# Patient Record
Sex: FEMALE | Race: OTHER | NOT HISPANIC OR LATINO
[De-identification: names, ages, dates, MRNs, and addresses within clinical notes are randomized per-mention and may not be internally consistent; named-entity substitution may affect disease eponyms.]

---

## 2021-03-26 ENCOUNTER — RESULT REVIEW (OUTPATIENT)
Age: 36
End: 2021-03-26

## 2021-07-06 PROBLEM — Z00.00 ENCOUNTER FOR PREVENTIVE HEALTH EXAMINATION: Status: ACTIVE | Noted: 2021-07-06

## 2021-07-08 ENCOUNTER — ASOB RESULT (OUTPATIENT)
Age: 36
End: 2021-07-08

## 2021-07-08 ENCOUNTER — APPOINTMENT (OUTPATIENT)
Dept: ANTEPARTUM | Facility: CLINIC | Age: 36
End: 2021-07-08
Payer: COMMERCIAL

## 2021-07-08 PROCEDURE — 76801 OB US < 14 WKS SINGLE FETUS: CPT

## 2021-07-08 PROCEDURE — 99202 OFFICE O/P NEW SF 15 MIN: CPT | Mod: 25

## 2021-07-08 PROCEDURE — 99072 ADDL SUPL MATRL&STAF TM PHE: CPT

## 2021-07-28 ENCOUNTER — APPOINTMENT (OUTPATIENT)
Dept: ANTEPARTUM | Facility: CLINIC | Age: 36
End: 2021-07-28
Payer: COMMERCIAL

## 2021-07-28 ENCOUNTER — ASOB RESULT (OUTPATIENT)
Age: 36
End: 2021-07-28

## 2021-07-28 PROCEDURE — 76805 OB US >/= 14 WKS SNGL FETUS: CPT

## 2021-07-30 ENCOUNTER — APPOINTMENT (OUTPATIENT)
Dept: ANTEPARTUM | Facility: CLINIC | Age: 36
End: 2021-07-30

## 2021-08-06 ENCOUNTER — OUTPATIENT (OUTPATIENT)
Dept: OUTPATIENT SERVICES | Facility: HOSPITAL | Age: 36
LOS: 1 days | End: 2021-08-06
Payer: COMMERCIAL

## 2021-08-06 ENCOUNTER — APPOINTMENT (OUTPATIENT)
Dept: ANTEPARTUM | Facility: CLINIC | Age: 36
End: 2021-08-06
Payer: COMMERCIAL

## 2021-08-06 ENCOUNTER — ASOB RESULT (OUTPATIENT)
Age: 36
End: 2021-08-06

## 2021-08-06 DIAGNOSIS — O20.8 OTHER HEMORRHAGE IN EARLY PREGNANCY: ICD-10-CM

## 2021-08-06 DIAGNOSIS — O28.5 ABNORMAL CHROMOSOMAL AND GENETIC FINDING ON ANTENATAL SCREENING OF MOTHER: ICD-10-CM

## 2021-08-06 DIAGNOSIS — Z3A.17 17 WEEKS GESTATION OF PREGNANCY: ICD-10-CM

## 2021-08-06 PROCEDURE — 76946 ECHO GUIDE FOR AMNIOCENTESIS: CPT

## 2021-08-06 PROCEDURE — 76946 ECHO GUIDE FOR AMNIOCENTESIS: CPT | Mod: 26

## 2021-08-06 PROCEDURE — 59000 AMNIOCENTESIS DIAGNOSTIC: CPT

## 2021-08-27 ENCOUNTER — APPOINTMENT (OUTPATIENT)
Dept: ANTEPARTUM | Facility: CLINIC | Age: 36
End: 2021-08-27
Payer: COMMERCIAL

## 2021-08-27 ENCOUNTER — ASOB RESULT (OUTPATIENT)
Age: 36
End: 2021-08-27

## 2021-08-27 PROCEDURE — 76811 OB US DETAILED SNGL FETUS: CPT

## 2021-10-12 DIAGNOSIS — Z01.818 ENCOUNTER FOR OTHER PREPROCEDURAL EXAMINATION: ICD-10-CM

## 2022-01-09 ENCOUNTER — TRANSCRIPTION ENCOUNTER (OUTPATIENT)
Age: 37
End: 2022-01-09

## 2022-01-09 ENCOUNTER — INPATIENT (INPATIENT)
Facility: HOSPITAL | Age: 37
LOS: 1 days | Discharge: HOME | End: 2022-01-11
Attending: OBSTETRICS & GYNECOLOGY | Admitting: OBSTETRICS & GYNECOLOGY
Payer: COMMERCIAL

## 2022-01-09 VITALS — OXYGEN SATURATION: 99 % | HEART RATE: 83 BPM

## 2022-01-09 DIAGNOSIS — Z3A.00 WEEKS OF GESTATION OF PREGNANCY NOT SPECIFIED: ICD-10-CM

## 2022-01-09 DIAGNOSIS — Z34.80 ENCOUNTER FOR SUPERVISION OF OTHER NORMAL PREGNANCY, UNSPECIFIED TRIMESTER: ICD-10-CM

## 2022-01-09 DIAGNOSIS — O26.899 OTHER SPECIFIED PREGNANCY RELATED CONDITIONS, UNSPECIFIED TRIMESTER: ICD-10-CM

## 2022-01-09 DIAGNOSIS — Z98.890 OTHER SPECIFIED POSTPROCEDURAL STATES: Chronic | ICD-10-CM

## 2022-01-09 LAB
BASOPHILS # BLD AUTO: 0.03 K/UL — SIGNIFICANT CHANGE UP (ref 0–0.2)
BASOPHILS # BLD AUTO: 0.03 K/UL — SIGNIFICANT CHANGE UP (ref 0–0.2)
BASOPHILS NFR BLD AUTO: 0.2 % — SIGNIFICANT CHANGE UP (ref 0–2)
BASOPHILS NFR BLD AUTO: 0.3 % — SIGNIFICANT CHANGE UP (ref 0–2)
BLD GP AB SCN SERPL QL: NEGATIVE — SIGNIFICANT CHANGE UP
COVID-19 SPIKE DOMAIN AB INTERP: POSITIVE
COVID-19 SPIKE DOMAIN ANTIBODY RESULT: 144 U/ML — HIGH
EOSINOPHIL # BLD AUTO: 0.02 K/UL — SIGNIFICANT CHANGE UP (ref 0–0.5)
EOSINOPHIL # BLD AUTO: 0.04 K/UL — SIGNIFICANT CHANGE UP (ref 0–0.5)
EOSINOPHIL NFR BLD AUTO: 0.2 % — SIGNIFICANT CHANGE UP (ref 0–6)
EOSINOPHIL NFR BLD AUTO: 0.2 % — SIGNIFICANT CHANGE UP (ref 0–6)
HCT VFR BLD CALC: 28.6 % — LOW (ref 34.5–45)
HCT VFR BLD CALC: 34.1 % — LOW (ref 34.5–45)
HGB BLD-MCNC: 10 G/DL — LOW (ref 11.5–15.5)
HGB BLD-MCNC: 12.1 G/DL — SIGNIFICANT CHANGE UP (ref 11.5–15.5)
IMM GRANULOCYTES NFR BLD AUTO: 0.5 % — SIGNIFICANT CHANGE UP (ref 0–1.5)
IMM GRANULOCYTES NFR BLD AUTO: 0.5 % — SIGNIFICANT CHANGE UP (ref 0–1.5)
LYMPHOCYTES # BLD AUTO: 0.8 K/UL — LOW (ref 1–3.3)
LYMPHOCYTES # BLD AUTO: 1.01 K/UL — SIGNIFICANT CHANGE UP (ref 1–3.3)
LYMPHOCYTES # BLD AUTO: 4.7 % — LOW (ref 13–44)
LYMPHOCYTES # BLD AUTO: 9.4 % — LOW (ref 13–44)
MCHC RBC-ENTMCNC: 33.7 PG — SIGNIFICANT CHANGE UP (ref 27–34)
MCHC RBC-ENTMCNC: 34.1 PG — HIGH (ref 27–34)
MCHC RBC-ENTMCNC: 35 GM/DL — SIGNIFICANT CHANGE UP (ref 32–36)
MCHC RBC-ENTMCNC: 35.5 GM/DL — SIGNIFICANT CHANGE UP (ref 32–36)
MCV RBC AUTO: 96.1 FL — SIGNIFICANT CHANGE UP (ref 80–100)
MCV RBC AUTO: 96.3 FL — SIGNIFICANT CHANGE UP (ref 80–100)
MONOCYTES # BLD AUTO: 0.47 K/UL — SIGNIFICANT CHANGE UP (ref 0–0.9)
MONOCYTES # BLD AUTO: 1.23 K/UL — HIGH (ref 0–0.9)
MONOCYTES NFR BLD AUTO: 4.4 % — SIGNIFICANT CHANGE UP (ref 2–14)
MONOCYTES NFR BLD AUTO: 7.2 % — SIGNIFICANT CHANGE UP (ref 2–14)
NEUTROPHILS # BLD AUTO: 14.89 K/UL — HIGH (ref 1.8–7.4)
NEUTROPHILS # BLD AUTO: 9.18 K/UL — HIGH (ref 1.8–7.4)
NEUTROPHILS NFR BLD AUTO: 85.2 % — HIGH (ref 43–77)
NEUTROPHILS NFR BLD AUTO: 87.2 % — HIGH (ref 43–77)
NRBC # BLD: 0 /100 WBCS — SIGNIFICANT CHANGE UP (ref 0–0)
NRBC # BLD: 0 /100 WBCS — SIGNIFICANT CHANGE UP (ref 0–0)
PLATELET # BLD AUTO: 105 K/UL — LOW (ref 150–400)
PLATELET # BLD AUTO: 108 K/UL — LOW (ref 150–400)
RBC # BLD: 2.97 M/UL — LOW (ref 3.8–5.2)
RBC # BLD: 3.55 M/UL — LOW (ref 3.8–5.2)
RBC # FLD: 15 % — HIGH (ref 10.3–14.5)
RBC # FLD: 15.1 % — HIGH (ref 10.3–14.5)
RH IG SCN BLD-IMP: POSITIVE — SIGNIFICANT CHANGE UP
RH IG SCN BLD-IMP: POSITIVE — SIGNIFICANT CHANGE UP
SARS-COV-2 IGG+IGM SERPL QL IA: 144 U/ML — HIGH
SARS-COV-2 IGG+IGM SERPL QL IA: POSITIVE
SARS-COV-2 RNA SPEC QL NAA+PROBE: SIGNIFICANT CHANGE UP
WBC # BLD: 10.76 K/UL — HIGH (ref 3.8–10.5)
WBC # BLD: 17.07 K/UL — HIGH (ref 3.8–10.5)
WBC # FLD AUTO: 10.76 K/UL — HIGH (ref 3.8–10.5)
WBC # FLD AUTO: 17.07 K/UL — HIGH (ref 3.8–10.5)

## 2022-01-09 RX ORDER — OXYTOCIN 10 UNIT/ML
333.33 VIAL (ML) INJECTION
Qty: 20 | Refills: 0 | Status: COMPLETED | OUTPATIENT
Start: 2022-01-09 | End: 2022-01-09

## 2022-01-09 RX ORDER — IBUPROFEN 200 MG
600 TABLET ORAL EVERY 6 HOURS
Refills: 0 | Status: DISCONTINUED | OUTPATIENT
Start: 2022-01-09 | End: 2022-01-11

## 2022-01-09 RX ORDER — DIPHENHYDRAMINE HCL 50 MG
25 CAPSULE ORAL EVERY 6 HOURS
Refills: 0 | Status: DISCONTINUED | OUTPATIENT
Start: 2022-01-09 | End: 2022-01-11

## 2022-01-09 RX ORDER — OXYCODONE HYDROCHLORIDE 5 MG/1
5 TABLET ORAL ONCE
Refills: 0 | Status: DISCONTINUED | OUTPATIENT
Start: 2022-01-09 | End: 2022-01-11

## 2022-01-09 RX ORDER — TRANEXAMIC ACID 100 MG/ML
1000 INJECTION, SOLUTION INTRAVENOUS ONCE
Refills: 0 | Status: DISCONTINUED | OUTPATIENT
Start: 2022-01-09 | End: 2022-01-09

## 2022-01-09 RX ORDER — CITRIC ACID/SODIUM CITRATE 300-500 MG
15 SOLUTION, ORAL ORAL EVERY 6 HOURS
Refills: 0 | Status: DISCONTINUED | OUTPATIENT
Start: 2022-01-09 | End: 2022-01-09

## 2022-01-09 RX ORDER — SODIUM CHLORIDE 9 MG/ML
1000 INJECTION, SOLUTION INTRAVENOUS
Refills: 0 | Status: DISCONTINUED | OUTPATIENT
Start: 2022-01-09 | End: 2022-01-09

## 2022-01-09 RX ORDER — KETOROLAC TROMETHAMINE 30 MG/ML
30 SYRINGE (ML) INJECTION ONCE
Refills: 0 | Status: DISCONTINUED | OUTPATIENT
Start: 2022-01-09 | End: 2022-01-09

## 2022-01-09 RX ORDER — MAGNESIUM HYDROXIDE 400 MG/1
30 TABLET, CHEWABLE ORAL
Refills: 0 | Status: DISCONTINUED | OUTPATIENT
Start: 2022-01-09 | End: 2022-01-11

## 2022-01-09 RX ORDER — AER TRAVELER 0.5 G/1
1 SOLUTION RECTAL; TOPICAL EVERY 4 HOURS
Refills: 0 | Status: DISCONTINUED | OUTPATIENT
Start: 2022-01-09 | End: 2022-01-11

## 2022-01-09 RX ORDER — IBUPROFEN 200 MG
600 TABLET ORAL EVERY 6 HOURS
Refills: 0 | Status: COMPLETED | OUTPATIENT
Start: 2022-01-09 | End: 2022-12-08

## 2022-01-09 RX ORDER — OXYTOCIN 10 UNIT/ML
333.33 VIAL (ML) INJECTION
Qty: 20 | Refills: 0 | Status: DISCONTINUED | OUTPATIENT
Start: 2022-01-09 | End: 2022-01-11

## 2022-01-09 RX ORDER — OXYTOCIN 10 UNIT/ML
4 VIAL (ML) INJECTION
Qty: 30 | Refills: 0 | Status: DISCONTINUED | OUTPATIENT
Start: 2022-01-09 | End: 2022-01-09

## 2022-01-09 RX ORDER — BENZOCAINE 10 %
1 GEL (GRAM) MUCOUS MEMBRANE EVERY 6 HOURS
Refills: 0 | Status: DISCONTINUED | OUTPATIENT
Start: 2022-01-09 | End: 2022-01-11

## 2022-01-09 RX ORDER — PRAMOXINE HYDROCHLORIDE 150 MG/15G
1 AEROSOL, FOAM RECTAL EVERY 4 HOURS
Refills: 0 | Status: DISCONTINUED | OUTPATIENT
Start: 2022-01-09 | End: 2022-01-11

## 2022-01-09 RX ORDER — LANOLIN
1 OINTMENT (GRAM) TOPICAL EVERY 6 HOURS
Refills: 0 | Status: DISCONTINUED | OUTPATIENT
Start: 2022-01-09 | End: 2022-01-11

## 2022-01-09 RX ORDER — TETANUS TOXOID, REDUCED DIPHTHERIA TOXOID AND ACELLULAR PERTUSSIS VACCINE, ADSORBED 5; 2.5; 8; 8; 2.5 [IU]/.5ML; [IU]/.5ML; UG/.5ML; UG/.5ML; UG/.5ML
0.5 SUSPENSION INTRAMUSCULAR ONCE
Refills: 0 | Status: DISCONTINUED | OUTPATIENT
Start: 2022-01-09 | End: 2022-01-11

## 2022-01-09 RX ORDER — OXYCODONE HYDROCHLORIDE 5 MG/1
5 TABLET ORAL
Refills: 0 | Status: DISCONTINUED | OUTPATIENT
Start: 2022-01-09 | End: 2022-01-11

## 2022-01-09 RX ORDER — DIBUCAINE 1 %
1 OINTMENT (GRAM) RECTAL EVERY 6 HOURS
Refills: 0 | Status: DISCONTINUED | OUTPATIENT
Start: 2022-01-09 | End: 2022-01-11

## 2022-01-09 RX ORDER — HYDROCORTISONE 1 %
1 OINTMENT (GRAM) TOPICAL EVERY 6 HOURS
Refills: 0 | Status: DISCONTINUED | OUTPATIENT
Start: 2022-01-09 | End: 2022-01-11

## 2022-01-09 RX ORDER — SODIUM CHLORIDE 9 MG/ML
3 INJECTION INTRAMUSCULAR; INTRAVENOUS; SUBCUTANEOUS EVERY 8 HOURS
Refills: 0 | Status: DISCONTINUED | OUTPATIENT
Start: 2022-01-09 | End: 2022-01-11

## 2022-01-09 RX ORDER — ACETAMINOPHEN 500 MG
975 TABLET ORAL
Refills: 0 | Status: DISCONTINUED | OUTPATIENT
Start: 2022-01-09 | End: 2022-01-11

## 2022-01-09 RX ORDER — TRANEXAMIC ACID 100 MG/ML
1000 INJECTION, SOLUTION INTRAVENOUS ONCE
Refills: 0 | Status: COMPLETED | OUTPATIENT
Start: 2022-01-09 | End: 2022-01-09

## 2022-01-09 RX ORDER — SIMETHICONE 80 MG/1
80 TABLET, CHEWABLE ORAL EVERY 4 HOURS
Refills: 0 | Status: DISCONTINUED | OUTPATIENT
Start: 2022-01-09 | End: 2022-01-11

## 2022-01-09 RX ADMIN — Medication 4 MILLIUNIT(S)/MIN: at 11:49

## 2022-01-09 RX ADMIN — Medication 0.2 MILLIGRAM(S): at 18:05

## 2022-01-09 RX ADMIN — Medication 30 MILLIGRAM(S): at 19:14

## 2022-01-09 RX ADMIN — SODIUM CHLORIDE 125 MILLILITER(S): 9 INJECTION, SOLUTION INTRAVENOUS at 12:33

## 2022-01-09 RX ADMIN — TRANEXAMIC ACID 220 MILLIGRAM(S): 100 INJECTION, SOLUTION INTRAVENOUS at 17:50

## 2022-01-09 RX ADMIN — Medication 1000 MILLIUNIT(S)/MIN: at 17:37

## 2022-01-09 RX ADMIN — SODIUM CHLORIDE 125 MILLILITER(S): 9 INJECTION, SOLUTION INTRAVENOUS at 10:55

## 2022-01-09 RX ADMIN — Medication 975 MILLIGRAM(S): at 21:57

## 2022-01-09 NOTE — OB PROVIDER H&P - HISTORY OF PRESENT ILLNESS
R2 H&P    Pt is a ***y/o G*P*** at *** admitted for ***  Prenatal course  GBS  EFW    OBHx:  GynHx: denies h/o abnormal paps, STI's, fibroids, cysts  PMHx:  PSHx:  Med:  All: NKDA  SH: denies alcohol, tobacco, or drug use  Psych: denies h/o anxiety or depression      EFH:  [place]:  VE:  TAUS:    A&P:   Labor: admit to L&D  -PO cytotec  -routine labs  -EFM/toco  -NPO, IV hydration  Fetal: cat 1 tracing, fetal status reassuring  GBS: neg  Analgesia:       Discussed with  Pasha Medina PGY-1 R2 H&P    Pt is a  37 y/o  at 39w2d presenting to triage with vaginal bleeding and painful contractions found to be in early labor. Patient reports that she had vaginal spotting the day prior never going through fully through a pad. She than started having painful contractions starting at 10:30pm the night prior. She began having more bright red bleeding at 5:30 AM and then began jc every 5 minutes, currently 7/10 pain.   Prenatal course c/b gestational thrombocytopenia   GBS negative  EFW 3200 (by last sono)     OBHx:   - sAB x 1 s/p D&C   - Current PNC c/b gestational thrombocytopenia, patient reports plts 4 days prior. Also had + Turners on NIPS s/p amniocentesis with normal results  - GBS neg, EFW 3200   GynHx: denies h/o abnormal paps, STI's, fibroids, cysts  PMHx: GERD  PSHx:   - Endoscopy   - D&C   Med: PNV, magnesium  All: NKDA  SH: denies alcohol, tobacco, or drug use  Psych: denies h/o anxiety or depression

## 2022-01-09 NOTE — DISCHARGE NOTE OB - PATIENT PORTAL LINK FT
You can access the FollowMyHealth Patient Portal offered by [facility] by registering at the following website: [email or web address]. By joining [employer]’s FollowMyHealth portal, you will also be able to view your health information using other applications (apps) compatible with our system.

## 2022-01-09 NOTE — OB PROVIDER H&P - NSHPPHYSICALEXAM_GEN_ALL_CORE
Vitals:  Vital Signs Last 24 Hrs  T(C): 36.4 (09 Jan 2022 10:57), Max: 36.9 (09 Jan 2022 09:23)  T(F): 97.52 (09 Jan 2022 10:57), Max: 98.42 (09 Jan 2022 09:23)  HR: 82 (09 Jan 2022 12:18) (60 - 92)  BP: 129/64 (09 Jan 2022 10:57) (119/57 - 129/64)  BP(mean): --  RR: 18 (09 Jan 2022 10:57) (18 - 18)  SpO2: 89% (09 Jan 2022 12:18) (89% - 100%)  EFH: Baseline 130s, moderate variability, accelerations present, no decels  [place]: Irregular  VE: 2.5/70/-3  Spec: minimal old blood in vaginal vault. No active bleeding from cervical os.  TAUS: vertex

## 2022-01-09 NOTE — DISCHARGE NOTE OB - HOSPITAL COURSE
Patient admitted in labor and had an vaginal delivery complicated by postpartum hemorrhage due to vaginal lacerations and atony.  Patient had an unremarkable postpartum course and was stable for discharge home on postpartum day 1.  Patient admitted in labor and had an vaginal delivery complicated by postpartum hemorrhage due to vaginal lacerations and atony.  Patient had an unremarkable postpartum course and was stable for discharge home on postpartum day 2.

## 2022-01-09 NOTE — DISCHARGE NOTE OB - CARE PLAN
Principal Discharge DX:	Normal vaginal delivery  Assessment and plan of treatment:	After discharge, please stay on pelvic rest for 6 weeks, meaning no sexual intercourse, no tampons and no douching.  No driving for 2 weeks as women can loose a lot of blood during delivery and there is a possibility of being lightheaded/fainting.  No lifting objects heavier than baby for two weeks.  Expect to have vaginal bleeding/spotting for up to six weeks.  The bleeding should get lighter and more white/light brown with time.  For bleeding soaking more than a pad an hour or passing clots greater than the size of your fist, come in to the emergency department.   1

## 2022-01-09 NOTE — DISCHARGE NOTE OB - CARE PROVIDER_API CALL
Rajwinder Breaux)  Obstetrics and Gynecology  [facility] [place], [address] [place] [address] [address]  [place] [address]  Phone: [phone]  Fax: [phone]  Follow Up Time:

## 2022-01-09 NOTE — DISCHARGE NOTE OB - NPI NUMBER (FOR SYSADMIN USE ONLY) :
Last seen 9/21/20    Last filled 12/15/20 qty 90 w/ 1 refill    Future Appointments   Date Time Provider Ronny King   7/2/2021 10:00 AM RHOAN Grayson BS AMB [[id number]]

## 2022-01-09 NOTE — OB PROVIDER H&P - ATTENDING COMMENTS
OB attending admit note     Patient seen and examined, agree with above assessment and plan.  Patient is 37 yo  at 39w3d admitted in labor.     Labs reviewed:   GBS neg   CBC 10/12/34/108  A+/ab neg   COvid pending   GBS neg      -admit  -for pitocin   -consent signed, all questions answered    Rajwinder Breaux MD

## 2022-01-09 NOTE — DISCHARGE NOTE OB - NS MD DC FALL RISK RISK
For information on Fall & Injury Prevention, visit: [email or web address] OR  [email or web address] OR  https://www.cdc.gov/steadi/patient.html

## 2022-01-09 NOTE — OB PROVIDER DELIVERY SUMMARY - NSSELHIDDEN_OBGYN_ALL_OB_FT
[NS_DeliveryAttending1_OBGYN_ALL_OB_FT:[id number]],[NS_DeliveryAssist1_OBGYN_ALL_OB_FT:[id number]],[NS_DeliveryRN_OBGYN_ALL_OB_FT:[id number]=],[NS_CirculateRN2_OBGYN_ALL_OB_FT:[id number]=]

## 2022-01-09 NOTE — DISCHARGE NOTE OB - MEDICATION SUMMARY - MEDICATIONS TO TAKE
I will START or STAY ON the medications listed below when I get home from the hospital:    acetaminophen 325 mg oral tablet  -- 3 tab(s) by mouth every 4 hours  -- Indication: For Normal vaginal delivery    ibuprofen 600 mg oral tablet  -- 1 tab(s) by mouth every 6 hours  -- Indication: For Normal vaginal delivery    ferrous sulfate 325 mg (65 mg elemental iron) oral tablet  -- 1 tab(s) by mouth 2 times a day  -- Indication: For Anemia due to acute blood loss    Prenatal Multivitamins oral tablet  -- 1 tab(s) by mouth once a day  -- Indication: For Normal vaginal delivery

## 2022-01-09 NOTE — OB PROVIDER DELIVERY SUMMARY - NSPROVIDERDELIVERYNOTE_OBGYN_ALL_OB_FT
Patient pushing over intact perineum.  Live female delivered from HORTENSIA position. Delayed cord clamping x 1 min.   Infant place on mom for skin-to-skin.  Placenta delivered spontaneously and intact with 3VC.   Uterus explored, no POCs, mild atony of MITCH--> Methegine IM given x 1.   Cervix intact.  Right second degree laceration and left sulcus tear revealed perirectal fat, repaired with 2-0 vicryl rapid.   Left labial tear repaired with 2-0 chromic. Brisk bleeding from lacerations, TXA given prior to repair.   EBL 675cc.

## 2022-01-09 NOTE — OB RN PATIENT PROFILE - NS_OBGYNHISTORY_OBGYN_ALL_OB_FT
misc x1 - 2021 with D&C  GERD  gest. thrombocytopenia  NIPS figueroa - amniocentesis negative  Covid + 9/2021

## 2022-01-09 NOTE — OB PROVIDER LABOR PROGRESS NOTE - ASSESSMENT
Patient will start pushing now    Rajwinder Breaux MD 
A/P: 35 yo  at 39w3d admitted in early labor, now in active labor  -continue pitocin, reassess 1-2hr  -peanut ball  -GBS neg   -epidural in place, for top-offs PRN    Rajwinder Breaux MD 
A/P: 35 yo  at 39w3d admitted in early labor, now receiving augmentation  -continue pitocin   -peanut ball  -GBS neg   -epidural in place, for top-offs PRMERRY Breaux MD

## 2022-01-09 NOTE — OB PROVIDER H&P - NSANTENATALSTERA_OBGYN_ALL_OB
Writer rechecked pt's blood glucose after administering correction when medication would be at peak. Pt's BG at this time was 405, increased from 367.  Writer paged peds endocrinologist per orders. Endocrinologist indicated no interventions were needed at this time.    Not applicable as gestational age is greater than or equal to 34 weeks.

## 2022-01-09 NOTE — OB PROVIDER H&P - NS_OBGYNHISTORY_OBGYN_ALL_OB_FT
OBHx:   - sAB x 1 s/p D&C   - Current PNC c/b gestational thrombocytopenia, patient reports plts 4 days prior. Also had + Turners on NIPS s/p amniocentesis with normal results  - GBS neg, EFW 3200   GynHx: denies h/o abnormal paps, STI's, fibroids, cysts

## 2022-01-09 NOTE — DISCHARGE NOTE OB - FLU SEASON?
Detail Level: Simple
Consent: The patient's consent was obtained including but not limited to risks of crusting, scabbing, blistering, scarring, darker or lighter pigmentary change, recurrence, incomplete removal and infection.
Render Post-Care Instructions In Note?: no
Number Of Freeze-Thaw Cycles: 1 freeze-thaw cycle
Post-Care Instructions: I reviewed with the patient in detail post-care instructions. Patient is to wear sunprotection, and avoid picking at any of the treated lesions. Pt may apply Vaseline to crusted or scabbing areas.
Medical Necessity Information: It is in your best interest to select a reason for this procedure from the list below. All of these items fulfill various CMS LCD requirements except the new and changing color options.
Medical Necessity Clause: This procedure was medically necessary because the lesions that were treated were:
Duration Of Freeze Thaw-Cycle (Seconds): 2
Yes...

## 2022-01-09 NOTE — OB NEONATOLOGY/PEDIATRICIAN DELIVERY SUMMARY - NSPEDSNEONOTESA_OBGYN_ALL_OB_FT
Reason for Peds consult : Clinton grunting  39W3D GA infant delivered via  to a 33 y/o  A+ mother. Pregnancy significant for gestational thrombocytopenia. OB hx : Miscariage x 1 S/P D&C. Prenatal labs : GBS neg (), HIV neg, RPR non-reactive, HBsAg neg, rubella immune. Hx of COVID  but both mother and father fully vaccinated and boosted and tested negative during this admission. AROM today at 1259 to clear fluid. Maternal Tmax 37.0. As per nurse attending the baby, baby dried, positioned and suctioned. Apgars 8/8. About half an hour of life, noted to be grunting and peds called to evaluated infant. On arrival, infant grunting with mild retractions, sats 97-98% RA, -150, mild nasal flaring, clear lungs. Rest of exam unremarkable. CPAP 5 21% initiated and given for 10 min resulting in marked improvement of grunting and retractions. Infant observed and remained comfortable on room air. Reason for Peds consult : Merna grunting  39W3D GA infant delivered via  to a 33 y/o  A+ mother. Pregnancy significant for gestational thrombocytopenia. OB hx : Miscariage x 1 S/P D&C. Prenatal labs : GBS neg (), HIV neg, RPR non-reactive, HBsAg neg, rubella immune. Hx of COVID  but both mother and father fully vaccinated and boosted and tested negative during this admission. AROM today at 1259 to clear fluid. Maternal Tmax 37.0. As per nurse attending the baby, baby dried, positioned and suctioned. Apgars 8/8. About half an hour of life, noted to be grunting and peds called to evaluated infant. On arrival, infant grunting with mild retractions, sats 97-98% RA, -150, mild nasal flaring, clear lungs. Rest of exam unremarkable. CPAP 5 21% initiated and given for 10 min resulting in marked improvement of grunting and retractions. Weaned to room air and infant remained comfortable. Mother plans to breastfeed and would like her infant to receive Hep B vaccine. EOS 0.11

## 2022-01-09 NOTE — OB RN DELIVERY SUMMARY - NSSELHIDDEN_OBGYN_ALL_OB_FT
[NS_DeliveryAttending1_OBGYN_ALL_OB_FT:[id number]],[NS_DeliveryAssist1_OBGYN_ALL_OB_FT:[id number]],[NS_DeliveryRN_OBGYN_ALL_OB_FT:[id number]],[NS_CirculateRN2_OBGYN_ALL_OB_FT:[id number]=]

## 2022-01-09 NOTE — OB RN DELIVERY SUMMARY - NS_SEPSISRSKCALC_OBGYN_ALL_OB_FT
EOS calculated successfully. EOS Risk Factor: 0.5/1000 live births ([facility] national incidence); GA=39w2d; Temp=98.6; ROM=4.55; GBS='Negative'; Antibiotics='No antibiotics or any antibiotics < 2 hrs prior to birth'

## 2022-01-09 NOTE — OB RN PATIENT PROFILE - FALL HARM RISK - UNIVERSAL INTERVENTIONS
Bed in lowest position, wheels locked, appropriate side rails in place/Call bell, personal items and telephone in reach/Instruct patient to call for assistance before getting out of bed or chair/Non-slip footwear when patient is out of bed/[place] to call system/Physically safe environment - no spills, clutter or unnecessary equipment/Purposeful Proactive Rounding/Room/bathroom lighting operational, light cord in reach

## 2022-01-09 NOTE — DISCHARGE NOTE OB - PLAN OF CARE
After discharge, please stay on pelvic rest for 6 weeks, meaning no sexual intercourse, no tampons and no douching.  No driving for 2 weeks as women can loose a lot of blood during delivery and there is a possibility of being lightheaded/fainting.  No lifting objects heavier than baby for two weeks.  Expect to have vaginal bleeding/spotting for up to six weeks.  The bleeding should get lighter and more white/light brown with time.  For bleeding soaking more than a pad an hour or passing clots greater than the size of your fist, come in to the emergency department.

## 2022-01-09 NOTE — OB PROVIDER H&P - ASSESSMENT
A&P:   Labor: admit to L&D for early labor  -PO cytotec  -routine labs, pitocin following CBC   -EFM/toco  -NPO, IV hydration  Fetal: cat 1 tracing, fetal status reassuring  GBS: neg  Analgesia:       Discussed with Dr. Saeid Medina PGY-2 A&P:   Labor: admit to L&D for early labor  - PO cytotec  - routine labs, pitocin following CBC due to hx of gestational thrombocytopenia  - EFM/toco  - CLD, IV hydration  Fetal: cat 1 tracing, fetal status reassuring  GBS: neg  Analgesia: epidural PRN      Discussed with Dr. Saeid Medina PGY-2

## 2022-01-09 NOTE — OB PROVIDER IHI INDUCTION/AUGMENTATION NOTE - NSVAGINALEXAM_OBGYN_ALL_OB_DT
09-Jan-2022 11:50 Anus position normal and patency confirmed, rectal-cutaneous fistula absent, normal anal wink.

## 2022-01-09 NOTE — OB RN PATIENT PROFILE - ALERT: PERTINENT HISTORY
Amniocentesis/1st Trimester Sonogram/20 Week Level II Sonogram/BioPhysical Profile(s)/Non Invasive Prenatal Screen (NIPS)/Fetal Non-Stress Test (NST)

## 2022-01-09 NOTE — OB PROVIDER LABOR PROGRESS NOTE - NS_SUBJECTIVE/OBJECTIVE_OBGYN_ALL_OB_FT
OB attending progress note    Patient examined s/p epidural   AROM now, clear
OB attending progress note
OB attending    Patient uncomfortable

## 2022-01-09 NOTE — OB PROVIDER H&P - NSICDXFAMILYHX_GEN_ALL_CORE_FT
FAMILY HISTORY:  Grandparent  Still living? Unknown  FH: leukemia, Age at diagnosis: Age Unknown  FH: pancreatic cancer, Age at diagnosis: Age Unknown

## 2022-01-10 DIAGNOSIS — D62 ACUTE POSTHEMORRHAGIC ANEMIA: ICD-10-CM

## 2022-01-10 LAB
BASOPHILS # BLD AUTO: 0.04 K/UL — SIGNIFICANT CHANGE UP (ref 0–0.2)
BASOPHILS NFR BLD AUTO: 0.3 % — SIGNIFICANT CHANGE UP (ref 0–2)
EOSINOPHIL # BLD AUTO: 0.04 K/UL — SIGNIFICANT CHANGE UP (ref 0–0.5)
EOSINOPHIL NFR BLD AUTO: 0.3 % — SIGNIFICANT CHANGE UP (ref 0–6)
HCT VFR BLD CALC: 27.9 % — LOW (ref 34.5–45)
HGB BLD-MCNC: 9.6 G/DL — LOW (ref 11.5–15.5)
IMM GRANULOCYTES NFR BLD AUTO: 0.4 % — SIGNIFICANT CHANGE UP (ref 0–1.5)
LYMPHOCYTES # BLD AUTO: 1.63 K/UL — SIGNIFICANT CHANGE UP (ref 1–3.3)
LYMPHOCYTES # BLD AUTO: 11.7 % — LOW (ref 13–44)
MCHC RBC-ENTMCNC: 33.7 PG — SIGNIFICANT CHANGE UP (ref 27–34)
MCHC RBC-ENTMCNC: 34.4 GM/DL — SIGNIFICANT CHANGE UP (ref 32–36)
MCV RBC AUTO: 97.9 FL — SIGNIFICANT CHANGE UP (ref 80–100)
MONOCYTES # BLD AUTO: 1.15 K/UL — HIGH (ref 0–0.9)
MONOCYTES NFR BLD AUTO: 8.3 % — SIGNIFICANT CHANGE UP (ref 2–14)
NEUTROPHILS # BLD AUTO: 10.98 K/UL — HIGH (ref 1.8–7.4)
NEUTROPHILS NFR BLD AUTO: 79 % — HIGH (ref 43–77)
NRBC # BLD: 0 /100 WBCS — SIGNIFICANT CHANGE UP (ref 0–0)
PLATELET # BLD AUTO: 119 K/UL — LOW (ref 150–400)
RBC # BLD: 2.85 M/UL — LOW (ref 3.8–5.2)
RBC # FLD: 15.2 % — HIGH (ref 10.3–14.5)
T PALLIDUM AB TITR SER: NEGATIVE — SIGNIFICANT CHANGE UP
WBC # BLD: 13.9 K/UL — HIGH (ref 3.8–10.5)
WBC # FLD AUTO: 13.9 K/UL — HIGH (ref 3.8–10.5)

## 2022-01-10 RX ORDER — FERROUS SULFATE 325(65) MG
325 TABLET ORAL
Refills: 0 | Status: DISCONTINUED | OUTPATIENT
Start: 2022-01-10 | End: 2022-01-11

## 2022-01-10 RX ORDER — ASCORBIC ACID 60 MG
500 TABLET,CHEWABLE ORAL
Refills: 0 | Status: DISCONTINUED | OUTPATIENT
Start: 2022-01-10 | End: 2022-01-11

## 2022-01-10 RX ADMIN — Medication 500 MILLIGRAM(S): at 17:38

## 2022-01-10 RX ADMIN — Medication 975 MILLIGRAM(S): at 04:12

## 2022-01-10 RX ADMIN — Medication 600 MILLIGRAM(S): at 21:30

## 2022-01-10 RX ADMIN — Medication 600 MILLIGRAM(S): at 01:08

## 2022-01-10 RX ADMIN — Medication 600 MILLIGRAM(S): at 21:00

## 2022-01-10 RX ADMIN — Medication 600 MILLIGRAM(S): at 00:03

## 2022-01-10 RX ADMIN — Medication 975 MILLIGRAM(S): at 10:30

## 2022-01-10 RX ADMIN — Medication 975 MILLIGRAM(S): at 18:30

## 2022-01-10 RX ADMIN — Medication 600 MILLIGRAM(S): at 05:11

## 2022-01-10 RX ADMIN — Medication 600 MILLIGRAM(S): at 13:30

## 2022-01-10 RX ADMIN — Medication 975 MILLIGRAM(S): at 09:43

## 2022-01-10 RX ADMIN — Medication 600 MILLIGRAM(S): at 12:41

## 2022-01-10 RX ADMIN — Medication 975 MILLIGRAM(S): at 23:49

## 2022-01-10 RX ADMIN — Medication 325 MILLIGRAM(S): at 17:38

## 2022-01-10 RX ADMIN — Medication 975 MILLIGRAM(S): at 03:11

## 2022-01-10 RX ADMIN — Medication 975 MILLIGRAM(S): at 17:38

## 2022-01-10 RX ADMIN — Medication 600 MILLIGRAM(S): at 05:21

## 2022-01-10 NOTE — PROGRESS NOTE ADULT - ATTENDING COMMENTS
patient seen. agree with note above with following addition. Mila is a 37yo  s/p  PPD1 after presenting in labor c/b inc PP bleeding with EBL of 675cc, s/p metergine x 1 and txa d/t sucal tear and intermittent MITCH atony. this am is doing well. denies HA, lightheadedness, dizziness, CP, SOB. Pain well controlled, ambulating, voiding, tolerating PO. baby January has not yet latched, will see lactation today.     VSS  abd: soft, fundus firm below umbilicus, NT  ext: non tender, mild edema    A/P: PPD1  c/b inc PP bleeding  - rh positive  - gThrombocytopenia, plt this am 119 (improving)  - MMRI/VI  - s/p tdap/flu  - baby january  - pt considering dc today vs tomorrow pending visit with lactation for breastfeeding support.

## 2022-01-11 VITALS
SYSTOLIC BLOOD PRESSURE: 111 MMHG | DIASTOLIC BLOOD PRESSURE: 65 MMHG | RESPIRATION RATE: 17 BRPM | HEART RATE: 82 BPM | TEMPERATURE: 98 F | OXYGEN SATURATION: 99 %

## 2022-01-11 PROCEDURE — 59050 FETAL MONITOR W/REPORT: CPT

## 2022-01-11 PROCEDURE — 85025 COMPLETE CBC W/AUTO DIFF WBC: CPT

## 2022-01-11 PROCEDURE — 86901 BLOOD TYPING SEROLOGIC RH(D): CPT

## 2022-01-11 PROCEDURE — 87635 SARS-COV-2 COVID-19 AMP PRB: CPT

## 2022-01-11 PROCEDURE — 36415 COLL VENOUS BLD VENIPUNCTURE: CPT

## 2022-01-11 PROCEDURE — G0463: CPT

## 2022-01-11 PROCEDURE — 59025 FETAL NON-STRESS TEST: CPT

## 2022-01-11 PROCEDURE — 86780 TREPONEMA PALLIDUM: CPT

## 2022-01-11 PROCEDURE — 86900 BLOOD TYPING SEROLOGIC ABO: CPT

## 2022-01-11 PROCEDURE — 86769 SARS-COV-2 COVID-19 ANTIBODY: CPT

## 2022-01-11 PROCEDURE — 86850 RBC ANTIBODY SCREEN: CPT

## 2022-01-11 RX ORDER — IBUPROFEN 200 MG
1 TABLET ORAL
Qty: 0 | Refills: 0 | DISCHARGE
Start: 2022-01-11

## 2022-01-11 RX ORDER — FERROUS SULFATE 325(65) MG
1 TABLET ORAL
Qty: 0 | Refills: 0 | DISCHARGE
Start: 2022-01-11

## 2022-01-11 RX ORDER — ACETAMINOPHEN 500 MG
3 TABLET ORAL
Qty: 0 | Refills: 0 | DISCHARGE
Start: 2022-01-11

## 2022-01-11 RX ADMIN — Medication 975 MILLIGRAM(S): at 12:22

## 2022-01-11 RX ADMIN — Medication 975 MILLIGRAM(S): at 05:55

## 2022-01-11 RX ADMIN — Medication 500 MILLIGRAM(S): at 05:28

## 2022-01-11 RX ADMIN — SIMETHICONE 80 MILLIGRAM(S): 80 TABLET, CHEWABLE ORAL at 12:22

## 2022-01-11 RX ADMIN — Medication 975 MILLIGRAM(S): at 13:00

## 2022-01-11 RX ADMIN — Medication 325 MILLIGRAM(S): at 05:28

## 2022-01-11 RX ADMIN — Medication 600 MILLIGRAM(S): at 08:35

## 2022-01-11 RX ADMIN — Medication 975 MILLIGRAM(S): at 05:25

## 2022-01-11 RX ADMIN — Medication 1 TABLET(S): at 12:23

## 2022-01-11 RX ADMIN — Medication 975 MILLIGRAM(S): at 00:19

## 2022-01-11 RX ADMIN — Medication 600 MILLIGRAM(S): at 09:15

## 2022-01-11 NOTE — PROGRESS NOTE ADULT - SUBJECTIVE AND OBJECTIVE BOX
LEONIDAS GARCÍA  MRN-[id number]    Subjective: PT DOING WELL. BFING AT PRESENT    Vital Signs Last 24 Hrs  T(C): 36.5 (2022 05:00), Max: 36.9 (10 Lionel 2022 09:31)  T(F): 97.7 (2022 05:00), Max: 98.4 (10 Lionel 2022 09:31)  HR: 82 (2022 05:00) (82 - 87)  BP: 111/65 (2022 05:00) (103/64 - 129/75)  BP(mean): --  RR: 17 (2022 05:00) (17 - 17)  SpO2: 99% (2022 05:00) (98% - 99%)    PHYSICAL EXAM:      Constitutional:W/D W/F NAD      Breasts: normal  Abdomen: Firm fundus  Pelvic: Lochia mild                                        REVIEW OF SYSTEMS      General:	    Skin/Breast:  		  Gastrointestinal:	    Genitourinary:	      MEDICATIONS  (STANDING):  acetaminophen     Tablet .. 975 milliGRAM(s) Oral <User Schedule>  ascorbic acid 500 milliGRAM(s) Oral two times a day  diphtheria/tetanus/pertussis (acellular) Vaccine (ADAcel) 0.5 milliLiter(s) IntraMuscular once  ferrous    sulfate 325 milliGRAM(s) Oral two times a day  ibuprofen  Tablet. 600 milliGRAM(s) Oral every 6 hours  oxytocin Infusion 333.333 milliUNIT(s)/Min (1000 mL/Hr) IV Continuous <Continuous>  prenatal multivitamin 1 Tablet(s) Oral daily  sodium chloride 0.9% lock flush 3 milliLiter(s) IV Push every 8 hours    MEDICATIONS  (PRN):  benzocaine 20%/menthol 0.5% Spray 1 Spray(s) Topical every 6 hours PRN for Perineal discomfort  dibucaine 1% Ointment 1 Application(s) Topical every 6 hours PRN Perineal discomfort  diphenhydrAMINE 25 milliGRAM(s) Oral every 6 hours PRN Pruritus  hydrocortisone 1% Cream 1 Application(s) Topical every 6 hours PRN Moderate Pain (4-6)  lanolin Ointment 1 Application(s) Topical every 6 hours PRN nipple soreness  magnesium hydroxide Suspension 30 milliLiter(s) Oral two times a day PRN Constipation  oxyCODONE    IR 5 milliGRAM(s) Oral every 3 hours PRN Moderate to Severe Pain (4-10)  oxyCODONE    IR 5 milliGRAM(s) Oral once PRN Moderate to Severe Pain (4-10)  pramoxine 1%/zinc 5% Cream 1 Application(s) Topical every 4 hours PRN Moderate Pain (4-6)  simethicone 80 milliGRAM(s) Chew every 4 hours PRN Gas  witch hazel Pads 1 Application(s) Topical every 4 hours PRN Perineal discomfort    Allergies    No Known Allergies    Intolerances        Impression:PPD #2  STABLE  ANEMIA- MILD 2 TO ACUTE BLD LOSS    Plan:D/C HOME. INSTR GIVEN. PPV 6 WEEKS    JAIME FINN
Postpartum Note- PPD#1    Allergies: No Known Allergies    RPR Negative  Blood Type  A  --  Positive  Rubella immune    Patient w/o complaints, pain is controlled.  Pt is OOB, tolerating PO, passing flatus. Lochia WNL.     O:  Vital Signs Last 24 Hrs  T(C): 36.2 (10 Lionel 2022 05:24), Max: 37.0 (09 Jan 2022 14:55)  T(F): 97.2 (10 Lionel 2022 05:24), Max: 98.6 (09 Jan 2022 14:55)  HR: 92 (10 Lionel 2022 05:24) (59 - 137)  BP: 92/62 (10 Lionel 2022 05:24) (92/62 - 150/72)  BP(mean): --  RR: 18 (10 Lionel 2022 05:24) (18 - 20)  SpO2: 100% (10 Lionel 2022 05:24) (79% - 100%)     Gen: NAD  Heart: S1S2 RRR  Lungs: CTA b/l  Abdomen: Soft, nontender, non-distended, fundus firm.  Lochia WNL  Ext: Neg edema, Neg calf tenderness    LABS:               9.6    13.90 )-----------( 119      ( 01-10 @ 06:36 )             27.9                10.0   17.07 )-----------( 105      ( 01-09 @ 22:23 )             28.6                12.1   10.76 )-----------( 108      ( 01-09 @ 09:07 )             34.1         PAST MEDICAL & SURGICAL HISTORY:  GERD (gastroesophageal reflux disease)    H/O dilation and curettage    Gestational thrombocytopenia - plts stable      Current Issues: acute blood loss anemia secondary to vaginal delivery with EBL of 600. does not require transfusion

## 2022-01-25 ENCOUNTER — NON-APPOINTMENT (OUTPATIENT)
Age: 37
End: 2022-01-25

## 2022-02-17 ENCOUNTER — RESULT REVIEW (OUTPATIENT)
Age: 37
End: 2022-02-17

## 2022-02-17 PROBLEM — K21.9 GASTRO-ESOPHAGEAL REFLUX DISEASE WITHOUT ESOPHAGITIS: Chronic | Status: ACTIVE | Noted: 2022-01-09

## 2022-02-18 ENCOUNTER — APPOINTMENT (OUTPATIENT)
Dept: SURGICAL ONCOLOGY | Facility: CLINIC | Age: 37
End: 2022-02-18
Payer: COMMERCIAL

## 2022-02-18 ENCOUNTER — LABORATORY RESULT (OUTPATIENT)
Age: 37
End: 2022-02-18

## 2022-02-18 VITALS
HEIGHT: 69 IN | TEMPERATURE: 98.2 F | WEIGHT: 194 LBS | DIASTOLIC BLOOD PRESSURE: 73 MMHG | SYSTOLIC BLOOD PRESSURE: 120 MMHG | HEART RATE: 73 BPM | OXYGEN SATURATION: 99 % | RESPIRATION RATE: 15 BRPM | BODY MASS INDEX: 28.73 KG/M2

## 2022-02-18 PROCEDURE — 99244 OFF/OP CNSLTJ NEW/EST MOD 40: CPT | Mod: 25

## 2022-02-18 PROCEDURE — 10061 I&D ABSCESS COMP/MULTIPLE: CPT

## 2022-02-18 NOTE — ADDENDUM
[FreeTextEntry1] : I, Kateryna Davies, acted solely as a scribe for Dr. Valentin Maldonado on this date 02/18/2022.\par

## 2022-02-18 NOTE — ASSESSMENT
[FreeTextEntry1] : S/p I&D left breast abscess \par Continue Bactrim\par F/U cxs\par Continue drain and dry dressing changes prn \par RTO 1 week to see Dr. Causey

## 2022-02-18 NOTE — PHYSICAL EXAM
[Normal] : supple, no neck mass and thyroid not enlarged [Normal Neck Lymph Nodes] : normal neck lymph nodes  [Normal Supraclavicular Lymph Nodes] : normal supraclavicular lymph nodes [Normal Groin Lymph Nodes] : normal groin lymph nodes [Normal Axillary Lymph Nodes] : normal axillary lymph nodes [Normal] : oriented to person, place and time, with appropriate affect [de-identified] : large fluctuant mass left areola extending inferiorly w breast erythema.  US confirms complex collection c/w abscess. right breast open wound 6-7:00 w mild erythema.

## 2022-02-18 NOTE — HISTORY OF PRESENT ILLNESS
[de-identified] : Patient is a 36 y/o female who presents an initial consultation. She is 6wks post partum and developed a bilateral breast infection secondary to nursing.  She had US guided aspirations last week and had been on keflex.  She is currently on Bactrim as per her GYN Dr. Rajwinder Breaux who was concerned about the appearance of the left breast. The right breast started to drain spontaneously,\par No fever/chills.\par

## 2022-02-18 NOTE — CONSULT LETTER
[Dear  ___] : Dear  [unfilled], [Consult Letter:] : I had the pleasure of evaluating your patient, [unfilled]. [Please see my note below.] : Please see my note below. [Sincerely,] : Sincerely, [FreeTextEntry3] : Valentin Maldonado MD FACS\par

## 2022-02-18 NOTE — PROCEDURE
[FreeTextEntry1] : Incision and drainage left breast abscess performed under sterile conditions using 1% lidocaine with epinephrine.\par 20-30 ccs of puss drained and sent for culture \par Penrose drain placed and secured to skin with 4-0 Nylon horizontal mattress suture \par Sterile dressing applied to breasts bilaterally \par Pt tolerated procedure well\par \par

## 2022-02-24 ENCOUNTER — APPOINTMENT (OUTPATIENT)
Dept: SURGICAL ONCOLOGY | Facility: CLINIC | Age: 37
End: 2022-02-24

## 2022-02-24 ENCOUNTER — APPOINTMENT (OUTPATIENT)
Dept: SURGICAL ONCOLOGY | Facility: CLINIC | Age: 37
End: 2022-02-24
Payer: COMMERCIAL

## 2022-02-24 VITALS
OXYGEN SATURATION: 98 % | RESPIRATION RATE: 16 BRPM | TEMPERATURE: 98.7 F | HEART RATE: 78 BPM | BODY MASS INDEX: 28.73 KG/M2 | WEIGHT: 194 LBS | DIASTOLIC BLOOD PRESSURE: 77 MMHG | HEIGHT: 69 IN | SYSTOLIC BLOOD PRESSURE: 124 MMHG

## 2022-02-24 PROCEDURE — 99214 OFFICE O/P EST MOD 30 MIN: CPT

## 2022-02-24 RX ORDER — SULFAMETHOXAZOLE AND TRIMETHOPRIM 800; 160 MG/1; MG/1
800-160 TABLET ORAL TWICE DAILY
Qty: 14 | Refills: 0 | Status: ACTIVE | COMMUNITY
Start: 2022-02-24 | End: 1900-01-01

## 2022-02-24 NOTE — PHYSICAL EXAM
[Normal] : supple, no neck mass and thyroid not enlarged [Normal Supraclavicular Lymph Nodes] : normal supraclavicular lymph nodes [Normal] : oriented to person, place and time, with appropriate affect [de-identified] : bilateral breast abscess.  Right side palpable.  Left side with penrose in place.  Erythema improved.

## 2022-02-24 NOTE — HISTORY OF PRESENT ILLNESS
[de-identified] : Ms. LEONIDAS GARCÍA  is a 37 year  old female who is a patient of my partner, Dr. Maldonado, here for a follow up visit. \par \par She is 6wks post partum and developed a bilateral breast infection secondary to nursing. She had US guided aspirations last week and had been on keflex. She is currently on Bactrim as per her GYN Dr. Rajwinder Breaux who was concerned about the appearance of the left breast. The right breast started to drain spontaneously. No fever/chills.\par \par Dr. Maldonado performed I&D of the left breast abscess on 2/18/2022.  20-30 cc's of pus drained and sent for culture. Penrose drain placed. Pt. was advised to continue Bactrim.  Cultures revealed moderate staphylococcus aureus - Bactrim sensitive. \par \par Pt. states the left breast was draining a lot for the 1st 5 days, and now there is minimal drainage. States the left breast abscess still feels tender and warm to touch. She states the right breast abscess feels softer to touch however still draining.  Now reports a new area in the left breast 11:00. \par

## 2022-03-04 ENCOUNTER — APPOINTMENT (OUTPATIENT)
Dept: SURGICAL ONCOLOGY | Facility: CLINIC | Age: 37
End: 2022-03-04
Payer: COMMERCIAL

## 2022-03-04 ENCOUNTER — LABORATORY RESULT (OUTPATIENT)
Age: 37
End: 2022-03-04

## 2022-03-04 VITALS
HEIGHT: 69 IN | DIASTOLIC BLOOD PRESSURE: 74 MMHG | TEMPERATURE: 98.9 F | HEART RATE: 63 BPM | BODY MASS INDEX: 28.14 KG/M2 | WEIGHT: 190 LBS | OXYGEN SATURATION: 99 % | SYSTOLIC BLOOD PRESSURE: 120 MMHG | RESPIRATION RATE: 17 BRPM

## 2022-03-04 PROCEDURE — 19120 REMOVAL OF BREAST LESION: CPT

## 2022-03-04 PROCEDURE — 99214 OFFICE O/P EST MOD 30 MIN: CPT | Mod: 24

## 2022-03-04 PROCEDURE — 10061 I&D ABSCESS COMP/MULTIPLE: CPT

## 2022-03-04 RX ORDER — SULFAMETHOXAZOLE AND TRIMETHOPRIM 800; 160 MG/1; MG/1
800-160 TABLET ORAL TWICE DAILY
Qty: 20 | Refills: 0 | Status: ACTIVE | COMMUNITY
Start: 2022-03-04 | End: 1900-01-01

## 2022-03-04 NOTE — OB RN PATIENT PROFILE - TEACHING/LEARNING FACTORS IMPACT ABILITY TO LEARN
-- DO NOT REPLY / DO NOT REPLY ALL --  -- Message is from the [facility]--    General Patient Message      Reason for Call: Patient is requesting an appointment with  as new patient.  She does not speak English, only Cantonese.  Please call patient back.    Caller Information       Type Contact Phone    03/04/2022 02:07 PM CST Phone (Incoming) Anabella Salazar (Self) [phone] (M)          Alternative phone number: None    Turnaround time given to caller:   \"This message will be sent to [state Provider's name]. The clinical team will fulfill your request as soon as they review your message.\"     none

## 2022-03-04 NOTE — PROCEDURE
[FreeTextEntry1] : I&D right breast abscess performed under sterile conditions using 1% lidocaine with epinephrine.\par 15 ccs of puss aspirated and sent for culture\par Portion of abscess cavity resected and sent for pathology to rule out granulomatous mastitis \par Left breast Penrose drain replaced \par Sterile dressing applied\par Pt tolerated procedure well\par \par

## 2022-03-04 NOTE — PHYSICAL EXAM
[Normal] : supple, no neck mass and thyroid not enlarged [Normal Supraclavicular Lymph Nodes] : normal supraclavicular lymph nodes [Normal] : oriented to person, place and time, with appropriate affect [de-identified] : bilateral breast abscess.  Left side with penrose in place.  Erythema improved.

## 2022-03-04 NOTE — ASSESSMENT
[FreeTextEntry1] : S/p I&D right breast abscess \par Follow up with cultures\par Continue dry dressing prn\par Continue Bactrim\par RTO 1 week

## 2022-03-04 NOTE — ADDENDUM
[FreeTextEntry1] : I, Kateryna Davies, acted solely as a scribe for Dr. Valentin Maldonado on this date 03/04/2022.\par \par

## 2022-03-11 ENCOUNTER — APPOINTMENT (OUTPATIENT)
Dept: SURGICAL ONCOLOGY | Facility: CLINIC | Age: 37
End: 2022-03-11
Payer: COMMERCIAL

## 2022-03-11 VITALS
BODY MASS INDEX: 28.14 KG/M2 | OXYGEN SATURATION: 99 % | SYSTOLIC BLOOD PRESSURE: 135 MMHG | TEMPERATURE: 98.7 F | HEIGHT: 69 IN | HEART RATE: 61 BPM | DIASTOLIC BLOOD PRESSURE: 78 MMHG | RESPIRATION RATE: 16 BRPM | WEIGHT: 190 LBS

## 2022-03-11 LAB — CORE LAB BIOPSY: NORMAL

## 2022-03-11 PROCEDURE — 99024 POSTOP FOLLOW-UP VISIT: CPT

## 2022-03-11 NOTE — ADDENDUM
[FreeTextEntry1] : I, Kateryna Davies, acted solely as a scribe for Dr. Valentin Maldonado on this date 03/11/2022.\par \par \par \par

## 2022-03-11 NOTE — PHYSICAL EXAM
[Normal] : supple, no neck mass and thyroid not enlarged [Normal Supraclavicular Lymph Nodes] : normal supraclavicular lymph nodes [Normal] : oriented to person, place and time, with appropriate affect [de-identified] : bilateral breast abscess.  Left side with penrose in place.  Erythema improved.

## 2022-03-11 NOTE — ASSESSMENT
[FreeTextEntry1] : S/p I&D right breast abscess \par Cultures sensitive to Bactrim\par Right breast drain replaced but not sutured to skin\par Continue dry dressing prn\par Continue Bactrim\par RTO 1 week or as needed

## 2022-03-16 ENCOUNTER — APPOINTMENT (OUTPATIENT)
Dept: SURGICAL ONCOLOGY | Facility: CLINIC | Age: 37
End: 2022-03-16
Payer: COMMERCIAL

## 2022-03-16 VITALS
RESPIRATION RATE: 16 BRPM | OXYGEN SATURATION: 97 % | WEIGHT: 190 LBS | HEIGHT: 69 IN | BODY MASS INDEX: 28.14 KG/M2 | HEART RATE: 63 BPM | DIASTOLIC BLOOD PRESSURE: 75 MMHG | TEMPERATURE: 97.6 F | SYSTOLIC BLOOD PRESSURE: 115 MMHG

## 2022-03-16 DIAGNOSIS — N61.1 ABSCESS OF THE BREAST AND NIPPLE: ICD-10-CM

## 2022-03-16 PROCEDURE — 99024 POSTOP FOLLOW-UP VISIT: CPT

## 2022-03-16 NOTE — ASSESSMENT
[FreeTextEntry1] : S/p I&D right breast abscess \par Significant improvement clinically \par Bilateral incisions nearly healed\par Complete antibiotics\par RTO prn 
29-Dec-2016

## 2022-03-16 NOTE — ADDENDUM
[FreeTextEntry1] : I, Kateryna Davies, acted solely as a scribe for Dr. Valentin Maldonado on this date 03/16/2022.\par \par \par \par \par

## 2022-03-16 NOTE — HISTORY OF PRESENT ILLNESS
[de-identified] : Ms. LEONIDAS GARCÍA  is a 37 year old female who presents for a follow up visit. \par She is s/p I&D right breast abscess on 3/4/21. Cultures are sensitive to Bactrim. She states significant improvement on Bactrim and reports it's her last day on the antibiotic. No fever/chills. She reports she is continuing pumping and dumping. \par \par S/p I&D of the left breast abscess on 2/18/2022.  20-30 cc's of pus drained and sent for culture w/ Penrose drain placement. Cultures revealed moderate staphylococcus aureus - Bactrim sensitive. \par \par PMHx: \par She was 6wks post partum and developed a bilateral breast infection secondary to nursing. She had US guided aspirations last week and had been on keflex. She was given Bactrim as per her GYN Dr. Rajwinder Breaux who was concerned about the appearance of the left breast. The right breast started to drain spontaneously.

## 2022-03-16 NOTE — PHYSICAL EXAM
[Normal] : supple, no neck mass and thyroid not enlarged [Normal Supraclavicular Lymph Nodes] : normal supraclavicular lymph nodes [Normal] : oriented to person, place and time, with appropriate affect [de-identified] : bilateral breast abscess.   left breast erythema improved.

## 2022-04-01 ENCOUNTER — APPOINTMENT (OUTPATIENT)
Dept: SURGICAL ONCOLOGY | Facility: CLINIC | Age: 37
End: 2022-04-01
Payer: COMMERCIAL

## 2022-04-01 VITALS
TEMPERATURE: 98.5 F | BODY MASS INDEX: 28.14 KG/M2 | HEART RATE: 71 BPM | OXYGEN SATURATION: 97 % | RESPIRATION RATE: 15 BRPM | SYSTOLIC BLOOD PRESSURE: 124 MMHG | WEIGHT: 190 LBS | DIASTOLIC BLOOD PRESSURE: 82 MMHG | HEIGHT: 69 IN

## 2022-04-01 PROCEDURE — 99214 OFFICE O/P EST MOD 30 MIN: CPT | Mod: 25

## 2022-04-01 PROCEDURE — 10060 I&D ABSCESS SIMPLE/SINGLE: CPT | Mod: 79

## 2022-04-04 RX ORDER — SULFAMETHOXAZOLE AND TRIMETHOPRIM 800; 160 MG/1; MG/1
800-160 TABLET ORAL TWICE DAILY
Qty: 20 | Refills: 0 | Status: ACTIVE | COMMUNITY
Start: 2022-04-04 | End: 1900-01-01

## 2022-04-04 NOTE — HISTORY OF PRESENT ILLNESS
[de-identified] : Ms. LEONIDAS GARCÍA  is a 37 year old female who presents with worsening pain and erythema left breast 12:00.\par She is s/p I&D right breast abscess on 3/4/21. Cultures are sensitive to Bactrim. She states significant improvement on Bactrim and reports it's her last day on the antibiotic. No fever/chills. She reports she is continuing pumping and dumping. \par \par S/p I&D of the left breast abscess on 2/18/2022.  20-30 cc's of pus drained and sent for culture w/ Penrose drain placement. Cultures revealed moderate staphylococcus aureus - Bactrim sensitive. \par \par PMHx: \par She was 6wks post partum and developed a bilateral breast infection secondary to nursing. She had US guided aspirations last week and had been on keflex. She was given Bactrim as per her GYN Dr. Rajwinder Breaux who was concerned about the appearance of the left breast. The right breast started to drain spontaneously.

## 2022-04-04 NOTE — ASSESSMENT
[FreeTextEntry1] : S/p I&D left breast abscess \par Follow-up cultures\par Restart Bactrim\par Change dry dressing as needed\par RTO 1 week

## 2022-04-04 NOTE — PHYSICAL EXAM
[Normal] : supple, no neck mass and thyroid not enlarged [Normal Supraclavicular Lymph Nodes] : normal supraclavicular lymph nodes [Normal] : oriented to person, place and time, with appropriate affect [de-identified] : Fluctuant mass left breast 12:00 with surrounding erythema consistent with abscess -ultrasound shows complex fluid collection tracking to the skin

## 2022-04-04 NOTE — PROCEDURE
[FreeTextEntry1] : I&D left breast 12:00 abscess performed under sterile conditions using 1% lidocaine with epinephrine.\par 30 ccs of puss aspirated and sent for culture\par Penrose drain placed and sutured to the skin with a 4-0 nylon horizontal mattress suture\par Sterile dressing applied\par Pt tolerated procedure well\par \par

## 2022-04-08 ENCOUNTER — APPOINTMENT (OUTPATIENT)
Dept: SURGICAL ONCOLOGY | Facility: CLINIC | Age: 37
End: 2022-04-08
Payer: COMMERCIAL

## 2022-04-08 VITALS
RESPIRATION RATE: 16 BRPM | HEART RATE: 69 BPM | TEMPERATURE: 97.6 F | DIASTOLIC BLOOD PRESSURE: 58 MMHG | OXYGEN SATURATION: 96 % | SYSTOLIC BLOOD PRESSURE: 137 MMHG | HEIGHT: 69 IN | BODY MASS INDEX: 28.14 KG/M2 | WEIGHT: 190 LBS

## 2022-04-08 LAB — BACTERIA WND CULT: ABNORMAL

## 2022-04-08 PROCEDURE — 99024 POSTOP FOLLOW-UP VISIT: CPT

## 2022-04-08 NOTE — ASSESSMENT
[FreeTextEntry1] : S/p I&D left breast abscess \par Cultures MSSA sensitive \par Continue Bactrim\par Change dry dressing as needed\par RTO 1 week

## 2022-04-08 NOTE — PROCEDURE
[FreeTextEntry1] : I&D left breast 12:00 abscess performed under sterile conditions using 1% lidocaine with epinephrine. 30 ccs of puss aspirated and sent for culture Penrose drain placed and sutured to the skin with a 4-0 nylon horizontal mattress suture Sterile dressing applied Pt tolerated procedure well

## 2022-04-08 NOTE — PHYSICAL EXAM
[Normal] : supple, no neck mass and thyroid not enlarged [Normal Supraclavicular Lymph Nodes] : normal supraclavicular lymph nodes [Normal] : oriented to person, place and time, with appropriate affect [de-identified] : left breast cavity granulating well.  no evidence of infection.

## 2022-04-08 NOTE — HISTORY OF PRESENT ILLNESS
[de-identified] : Ms. LEONIDAS GARCÍA  is a 37 year old female who presents a follow up visit. \par S/p I&D left breast abscess. Cultures 4/1/22 revealed MSSA sensitive. She was re-started on Bactrim on her last visit with significant improvement. She discontinued pumping. Denies fever/chills. \par \par She is s/p I&D right breast abscess on 3/4/21. Cultures are sensitive to Bactrim.\par \par S/p I&D of the left breast abscess on 2/18/2022.  20-30 cc's of pus drained and sent for culture w/ Penrose drain placement. Cultures revealed moderate staphylococcus aureus - Bactrim sensitive. \par \par PMHx: \par She was 6wks post partum and developed a bilateral breast infection secondary to nursing. She had US guided aspirations last week and had been on keflex. She was given Bactrim as per her GYN Dr. Rajwinder Breaux who was concerned about the appearance of the left breast. The right breast started to drain spontaneously.

## 2022-04-08 NOTE — ADDENDUM
[FreeTextEntry1] : I, Kateryna Davies, acted solely as a scribe for Dr. Valentin Maldonado on this date 04/08/2022.\par \par \par

## 2022-04-13 ENCOUNTER — APPOINTMENT (OUTPATIENT)
Dept: SURGICAL ONCOLOGY | Facility: CLINIC | Age: 37
End: 2022-04-13
Payer: COMMERCIAL

## 2022-04-13 VITALS
DIASTOLIC BLOOD PRESSURE: 74 MMHG | BODY MASS INDEX: 28.14 KG/M2 | TEMPERATURE: 98.1 F | OXYGEN SATURATION: 98 % | HEART RATE: 78 BPM | SYSTOLIC BLOOD PRESSURE: 124 MMHG | WEIGHT: 190 LBS | RESPIRATION RATE: 16 BRPM | HEIGHT: 69 IN

## 2022-04-13 PROCEDURE — 99024 POSTOP FOLLOW-UP VISIT: CPT

## 2022-04-13 NOTE — PHYSICAL EXAM
[Normal] : supple, no neck mass and thyroid not enlarged [Normal Supraclavicular Lymph Nodes] : normal supraclavicular lymph nodes [Normal] : oriented to person, place and time, with appropriate affect [de-identified] : left breast I&D site healing well w/ significant residual cavity. Penrose drain removed. wound re-packed.  No engorgement.

## 2022-04-13 NOTE — ADDENDUM
[FreeTextEntry1] : I, Kateryna Davies, acted solely as a scribe for Dr. Valentin Maldonado on this date 04/13/2022.\par \par \par

## 2022-04-13 NOTE — HISTORY OF PRESENT ILLNESS
[de-identified] : Ms. LEONIDAS GARCÍA  is a 37 year old female who presents a follow up visit. \par S/p I&D left breast abscess. Cultures 4/1/22 revealed MSSA sensitive. She is continuing Bactrim with significant improvement. She discontinued pumping. Denies fever/chills. \par \par She is s/p I&D right breast abscess on 3/4/21. Cultures are sensitive to Bactrim.\par \par S/p I&D of the left breast abscess on 2/18/2022.  20-30 cc's of pus drained and sent for culture w/ Penrose drain placement. Cultures revealed moderate staphylococcus aureus - Bactrim sensitive. \par \par PMHx: \par She was 6wks post partum and developed a bilateral breast infection secondary to nursing. She had US guided aspirations last week and had been on keflex. She was given Bactrim as per her GYN Dr. Rajwinder Breaux who was concerned about the appearance of the left breast. The right breast started to drain spontaneously.

## 2022-04-13 NOTE — ASSESSMENT
[FreeTextEntry1] : S/p I&D left breast abscess \par Penrose drain removed and wound re-packed \par Continue Bactrim\par Pt instructed to change dry dressing daily until wound closed\par RTO 2 weeks or prn

## 2022-04-27 ENCOUNTER — APPOINTMENT (OUTPATIENT)
Dept: SURGICAL ONCOLOGY | Facility: CLINIC | Age: 37
End: 2022-04-27
Payer: COMMERCIAL

## 2022-04-27 VITALS
HEIGHT: 69 IN | DIASTOLIC BLOOD PRESSURE: 77 MMHG | TEMPERATURE: 97.7 F | RESPIRATION RATE: 15 BRPM | HEART RATE: 66 BPM | BODY MASS INDEX: 27.4 KG/M2 | SYSTOLIC BLOOD PRESSURE: 120 MMHG | WEIGHT: 185 LBS | OXYGEN SATURATION: 99 %

## 2022-04-27 DIAGNOSIS — N61.1 ABSCESS OF THE BREAST AND NIPPLE: ICD-10-CM

## 2022-04-27 PROCEDURE — 99024 POSTOP FOLLOW-UP VISIT: CPT

## 2022-04-30 PROBLEM — N61.1 LEFT BREAST ABSCESS: Status: ACTIVE | Noted: 2022-02-18

## 2022-04-30 NOTE — PHYSICAL EXAM
[Normal] : supple, no neck mass and thyroid not enlarged [Normal Supraclavicular Lymph Nodes] : normal supraclavicular lymph nodes [Normal] : oriented to person, place and time, with appropriate affect [de-identified] : left breast I&D site well healed w/ no evidence of recurrence.

## 2022-04-30 NOTE — HISTORY OF PRESENT ILLNESS
[de-identified] : Ms. LEONIDAS GARCÍA  is a 37 year old female who presents a follow up visit. \par S/p I&D left breast abscess. Cultures 4/1/22 revealed MSSA sensitive. She discontinued pumping. Denies fever/chills. \par \par She is s/p I&D right breast abscess on 3/4/21. Cultures are sensitive to Bactrim.\par \par S/p I&D of the left breast abscess on 2/18/2022.  20-30 cc's of pus drained and sent for culture w/ Penrose drain placement. Cultures revealed moderate staphylococcus aureus - Bactrim sensitive. \par \par PMHx: \par She was 6wks post partum and developed a bilateral breast infection secondary to nursing. She had US guided aspirations last week and had been on keflex. She was given Bactrim as per her GYN Dr. Rajwinder Breaux who was concerned about the appearance of the left breast. The right breast started to drain spontaneously.

## 2022-04-30 NOTE — ASSESSMENT
[FreeTextEntry1] : Left breast abscess resolved status post I&D\par No active infections bilaterally\par RTO prn

## 2022-04-30 NOTE — ADDENDUM
[FreeTextEntry1] : I, Kateryna Davies, acted solely as a scribe for Dr. Valentin Maldonado on this date 04/27/2022.\par \par \par

## 2022-12-24 ENCOUNTER — APPOINTMENT (OUTPATIENT)
Dept: AFTER HOURS CARE | Facility: EMERGENCY ROOM | Age: 37
End: 2022-12-24
Payer: COMMERCIAL

## 2022-12-24 ENCOUNTER — EMERGENCY (EMERGENCY)
Facility: HOSPITAL | Age: 37
LOS: 1 days | Discharge: HOME | End: 2022-12-24
Attending: EMERGENCY MEDICINE | Admitting: EMERGENCY MEDICINE
Payer: COMMERCIAL

## 2022-12-24 VITALS
HEIGHT: 69 IN | RESPIRATION RATE: 16 BRPM | TEMPERATURE: 98 F | DIASTOLIC BLOOD PRESSURE: 51 MMHG | OXYGEN SATURATION: 100 % | HEART RATE: 72 BPM | SYSTOLIC BLOOD PRESSURE: 113 MMHG | WEIGHT: 181 LBS

## 2022-12-24 VITALS
HEART RATE: 70 BPM | SYSTOLIC BLOOD PRESSURE: 114 MMHG | OXYGEN SATURATION: 99 % | RESPIRATION RATE: 18 BRPM | TEMPERATURE: 98 F | DIASTOLIC BLOOD PRESSURE: 62 MMHG

## 2022-12-24 DIAGNOSIS — O46.90 ANTEPARTUM HEMORRHAGE, UNSPECIFIED, UNSPECIFIED TRIMESTER: ICD-10-CM

## 2022-12-24 DIAGNOSIS — Z98.890 OTHER SPECIFIED POSTPROCEDURAL STATES: Chronic | ICD-10-CM

## 2022-12-24 LAB
ALBUMIN SERPL ELPH-MCNC: 4 G/DL — SIGNIFICANT CHANGE UP (ref 3.3–5)
ALP SERPL-CCNC: 47 U/L — SIGNIFICANT CHANGE UP (ref 30–120)
ALT FLD-CCNC: <10 U/L DA — LOW (ref 10–60)
ANION GAP SERPL CALC-SCNC: 12 MMOL/L — SIGNIFICANT CHANGE UP (ref 5–17)
APPEARANCE UR: CLEAR — SIGNIFICANT CHANGE UP
AST SERPL-CCNC: 24 U/L — SIGNIFICANT CHANGE UP (ref 10–40)
BACTERIA # UR AUTO: ABNORMAL
BASOPHILS # BLD AUTO: 0.04 K/UL — SIGNIFICANT CHANGE UP (ref 0–0.2)
BASOPHILS NFR BLD AUTO: 0.8 % — SIGNIFICANT CHANGE UP (ref 0–2)
BILIRUB SERPL-MCNC: 1.8 MG/DL — HIGH (ref 0.2–1.2)
BILIRUB UR-MCNC: NEGATIVE — SIGNIFICANT CHANGE UP
BLD GP AB SCN SERPL QL: SIGNIFICANT CHANGE UP
BUN SERPL-MCNC: 11 MG/DL — SIGNIFICANT CHANGE UP (ref 7–23)
CALCIUM SERPL-MCNC: 8.6 MG/DL — SIGNIFICANT CHANGE UP (ref 8.4–10.5)
CHLORIDE SERPL-SCNC: 106 MMOL/L — SIGNIFICANT CHANGE UP (ref 96–108)
CO2 SERPL-SCNC: 22 MMOL/L — SIGNIFICANT CHANGE UP (ref 22–31)
COLOR SPEC: ABNORMAL
CREAT SERPL-MCNC: 0.67 MG/DL — SIGNIFICANT CHANGE UP (ref 0.5–1.3)
DIFF PNL FLD: ABNORMAL
EGFR: 117 ML/MIN/1.73M2 — SIGNIFICANT CHANGE UP
EOSINOPHIL # BLD AUTO: 0.1 K/UL — SIGNIFICANT CHANGE UP (ref 0–0.5)
EOSINOPHIL NFR BLD AUTO: 2 % — SIGNIFICANT CHANGE UP (ref 0–6)
EPI CELLS # UR: SIGNIFICANT CHANGE UP
GLUCOSE SERPL-MCNC: 100 MG/DL — HIGH (ref 70–99)
GLUCOSE UR QL: NEGATIVE MG/DL — SIGNIFICANT CHANGE UP
HCG SERPL-ACNC: HIGH MIU/ML
HCT VFR BLD CALC: 33.6 % — LOW (ref 34.5–45)
HGB BLD-MCNC: 11.8 G/DL — SIGNIFICANT CHANGE UP (ref 11.5–15.5)
IMM GRANULOCYTES NFR BLD AUTO: 0.2 % — SIGNIFICANT CHANGE UP (ref 0–0.9)
KETONES UR-MCNC: NEGATIVE — SIGNIFICANT CHANGE UP
LEUKOCYTE ESTERASE UR-ACNC: ABNORMAL
LYMPHOCYTES # BLD AUTO: 1.65 K/UL — SIGNIFICANT CHANGE UP (ref 1–3.3)
LYMPHOCYTES # BLD AUTO: 33.1 % — SIGNIFICANT CHANGE UP (ref 13–44)
MCHC RBC-ENTMCNC: 32.1 PG — SIGNIFICANT CHANGE UP (ref 27–34)
MCHC RBC-ENTMCNC: 35.1 GM/DL — SIGNIFICANT CHANGE UP (ref 32–36)
MCV RBC AUTO: 91.3 FL — SIGNIFICANT CHANGE UP (ref 80–100)
MONOCYTES # BLD AUTO: 0.34 K/UL — SIGNIFICANT CHANGE UP (ref 0–0.9)
MONOCYTES NFR BLD AUTO: 6.8 % — SIGNIFICANT CHANGE UP (ref 2–14)
NEUTROPHILS # BLD AUTO: 2.84 K/UL — SIGNIFICANT CHANGE UP (ref 1.8–7.4)
NEUTROPHILS NFR BLD AUTO: 57.1 % — SIGNIFICANT CHANGE UP (ref 43–77)
NITRITE UR-MCNC: NEGATIVE — SIGNIFICANT CHANGE UP
NRBC # BLD: 0 /100 WBCS — SIGNIFICANT CHANGE UP (ref 0–0)
PH UR: 6 — SIGNIFICANT CHANGE UP (ref 5–8)
PLATELET # BLD AUTO: 140 K/UL — LOW (ref 150–400)
POTASSIUM SERPL-MCNC: 4.1 MMOL/L — SIGNIFICANT CHANGE UP (ref 3.5–5.3)
POTASSIUM SERPL-SCNC: 4.1 MMOL/L — SIGNIFICANT CHANGE UP (ref 3.5–5.3)
PROT SERPL-MCNC: 6.8 G/DL — SIGNIFICANT CHANGE UP (ref 6–8.3)
PROT UR-MCNC: 30 MG/DL
RBC # BLD: 3.68 M/UL — LOW (ref 3.8–5.2)
RBC # FLD: 13.5 % — SIGNIFICANT CHANGE UP (ref 10.3–14.5)
RBC CASTS # UR COMP ASSIST: ABNORMAL /HPF (ref 0–4)
SODIUM SERPL-SCNC: 140 MMOL/L — SIGNIFICANT CHANGE UP (ref 135–145)
SP GR SPEC: 1.01 — SIGNIFICANT CHANGE UP (ref 1.01–1.02)
UROBILINOGEN FLD QL: NEGATIVE MG/DL — SIGNIFICANT CHANGE UP
WBC # BLD: 4.98 K/UL — SIGNIFICANT CHANGE UP (ref 3.8–10.5)
WBC # FLD AUTO: 4.98 K/UL — SIGNIFICANT CHANGE UP (ref 3.8–10.5)
WBC UR QL: SIGNIFICANT CHANGE UP

## 2022-12-24 PROCEDURE — 76802 OB US < 14 WKS ADDL FETUS: CPT

## 2022-12-24 PROCEDURE — 76801 OB US < 14 WKS SINGLE FETUS: CPT | Mod: 26

## 2022-12-24 PROCEDURE — 86850 RBC ANTIBODY SCREEN: CPT

## 2022-12-24 PROCEDURE — 86901 BLOOD TYPING SEROLOGIC RH(D): CPT

## 2022-12-24 PROCEDURE — 36415 COLL VENOUS BLD VENIPUNCTURE: CPT

## 2022-12-24 PROCEDURE — 86900 BLOOD TYPING SEROLOGIC ABO: CPT

## 2022-12-24 PROCEDURE — 80053 COMPREHEN METABOLIC PANEL: CPT

## 2022-12-24 PROCEDURE — 85025 COMPLETE CBC W/AUTO DIFF WBC: CPT

## 2022-12-24 PROCEDURE — 99284 EMERGENCY DEPT VISIT MOD MDM: CPT

## 2022-12-24 PROCEDURE — 99203 OFFICE O/P NEW LOW 30 MIN: CPT | Mod: NC,95

## 2022-12-24 PROCEDURE — 81001 URINALYSIS AUTO W/SCOPE: CPT

## 2022-12-24 PROCEDURE — 84702 CHORIONIC GONADOTROPIN TEST: CPT

## 2022-12-24 NOTE — ED PROVIDER NOTE - OBJECTIVE STATEMENT
37 F A1, 9 weeks pregnant, presents with vaginal bleeding x today. States last week had spotting, but this morning with light bleeding that is bright red with small clot. States bleeding has decreased since onset. Saw her obgyn 3 weeks ago with IUP, +FHR. Has follow up in 1 week. Denies pain. Otherwise in USOH.

## 2022-12-24 NOTE — PHYSICAL EXAM
[No Acute Distress] : no acute distress [Well Nourished] : well nourished [Well Developed] : well developed [Well-Appearing] : well-appearing [Normal Sclera/Conjunctiva] : normal sclera/conjunctiva [No Respiratory Distress] : no respiratory distress  [No Rash] : no rash [Coordination Grossly Intact] : coordination grossly intact [No Focal Deficits] : no focal deficits [Normal Affect] : the affect was normal [Normal Insight/Judgement] : insight and judgment were intact

## 2022-12-24 NOTE — HISTORY OF PRESENT ILLNESS
[Home] : at home, [unfilled] , at the time of the visit. [Other Location: e.g. Home (Enter Location, City,State)___] : at [unfilled] [Verbal consent obtained from patient] : the patient, [unfilled] [FreeTextEntry8] : 38 yo F   approx 9 weeks pregnant c/o bright red vaginal bleeding with min cramping.  Pt had light staining  x 1 day last week.  Pt denies any assoc trauma or recent intercourse. Pt followed at [facility]

## 2022-12-24 NOTE — ED PROVIDER NOTE - PATIENT PORTAL LINK FT
You can access the FollowMyHealth Patient Portal offered by [facility] by registering at the following website: [email or web address]. By joining [facility]’s FollowMyHealth portal, you will also be able to view your health information using other applications (apps) compatible with our system.

## 2022-12-24 NOTE — ED PROVIDER NOTE - NS ED ATTENDING STATEMENT MOD
This was a shared visit with the REG[facility]. I reviewed and verified the documentation and independently performed the documented:

## 2022-12-24 NOTE — ED PROVIDER NOTE - NSFOLLOWUPINSTRUCTIONS_ED_ALL_ED_FT
Call your gynecologist Monday for follow up.  Return for worsening or concerning symptoms.              A miscarriage is the loss of a pregnancy before the 20th week of pregnancy. Sometimes, a pregnancy ends before a woman knows that she is pregnant.    If you lose a pregnancy, talk with your doctor about:  •Questions you have about the loss of your baby.      •How to work through your grief.      •Plans for future pregnancy.        What are the causes?    Many times, the cause of this condition is not known.      What increases the risk?    These things may make a pregnant woman more likely to lose a pregnancy:    Certain health conditions   •Conditions that affect hormones, such as:   •Thyroid disease.      •Polycystic ovary syndrome.        •Diabetes.      •A disease that causes the body's disease-fighting system to attack itself by mistake.      •Infections.      •Bleeding problems.      •Being very overweight.      Lifestyle factors     •Using products that have tobacco or nicotine in them.      •Being around tobacco smoke.      •Having alcohol.      •Having a lot of caffeine.      •Using drugs.      Problems with reproductive organs or parts     •Having a cervix that opens and thins before your due date. The cervix is the lowest part of your womb.    •Having Asherman syndrome, which leads to:  •Scars in the womb.      •The womb being abnormal in shape.        •Growths (fibroids) in the womb.      •Problems in the body that are present at birth.      •Infection of the cervix or womb.      Personal or health history     •Injury.      •Having lost a pregnancy before.      •Being younger than age 18 or older than age 35.      •Being around a harmful substance, such as radiation.    •Having lead or other heavy metals in:  •Things you eat or drink.      •The air around you.        •Using certain medicines.        What are the signs or symptoms?    •Blood or spots of blood coming from the vagina. You may also have cramps or pain.      •Pain or cramps in the belly or low back.      •Fluid or tissue coming out of the vagina.        How is this treated?    Sometimes, treatment is not needed.    If you need treatment, you may be treated with:  •A procedure to open the cervix more and take tissue out of the womb.      •Medicines. You may get a shot of medicine called Rho(D) immune globulin.        Follow these instructions at home:    Medicines     •Take over-the-counter and prescription medicines only as told by your doctor.      •If you were prescribed antibiotic medicine, take it as told by your doctor. Do not stop taking it even if you start to feel better.      Activity     •Rest as told by your doctor. Ask your doctor what activities are safe for you.      •Have someone help you at home during this time.        General instructions      •Watch how much tissue comes out of the vagina.      •Watch the size of any blood clots that come out of the vagina.      • Do not have sex or douche until your doctor says it is okay.      • Do not put things, such as tampons, in your vagina until your doctor says it is okay.    •To help you and your partner with grieving:  •Talk with your doctor.      •See a counselor.      •When you are ready, talk with your doctor about:   •Things to do for your health.      •How you can be healthy if you get pregnant again.        •Keep all follow-up visits.        Where to find more information    •The American College of Obstetricians and Gynecologists: acog.org      •[place] Department of Health and Human Services Office of Women's Health: hrsa.gov/office-womens-health        Contact a doctor if:    •You have a fever or chills.      •There is bad-smelling fluid coming from the vagina.      •You have more bleeding.      •Tissue or clots of blood come out of your vagina.        Get help right away if:    •You have very bad cramps or pain in your back or belly.      •You soak more than 2 large pads in an hour for more than 2 hours.      •You get light-headed or weak.      •You faint.      •You feel sad, and you have sad thoughts a lot of the time.      •You think about hurting yourself.      Get help right away if you feel like you may hurt yourself or others, or have thoughts about taking your own life. Go to your nearest emergency room or:    • Call your local emergency services (474 in the [place]       • Call the National Suicide Prevention Lifeline at 1-213.541.5696 or 982 in the [place] This is open 24 hours a day.       • Text the Crisis Text Line at 755432.         Summary    •A miscarriage is the loss of a pregnancy before the 20th week of pregnancy. Sometimes, a pregnancy ends before a woman knows that she is pregnant.      •Follow instructions from your doctor about medicines and activity.      •To help you and your partner with grieving, talk with your doctor or a counselor.      •Keep all follow-up visits.      This information is not intended to replace advice given to you by your health care provider. Make sure you discuss any questions you have with your health care provider.

## 2022-12-24 NOTE — ED ADULT NURSE NOTE - DISCHARGE DATE/TIME
1.  We have scheduled a CPAP titration sleep study. If insurance denies the the plan will be to start an AutoPap machine  2. Recommended follow-up after the sleep study  3. Recommend avoiding alcohol and sedatives and not operate a motor vehicle while drowsy  4. Untreated sleep apnea is associated with an increased risk for cardiovascular disease  
24-Dec-2022 15:39

## 2022-12-24 NOTE — ED PROVIDER NOTE - CCCP TRG CHIEF CMPLNT
1) Hypertension  BP not controlled despite, po labetalol, nifedipine, coreg, and furosemide  Patient needed hydralazine 10 mg IV push which brought pressure from 230 systolic to 183, but pressures started increasing again     2) ESRD   RRT per renal     3) Vomiting resolved   Coffee ground emesis episode times 2.  resolved   H&H stable   offered EGD by GI but patient declined     4) Elevated Troponin  trended down   asymptomatic   will need stress test in future but will need EGD prior. patient declined EGD and stress test    5) Fever 100.3 monitor fever curve. check blood culture patient has tesio.     6) DM  targer -180 in hospital .     7) DVT Prophylaxis   hep sub c       Follow up blood culture and c/w reglan. Possible gastroparesis. Advance diet slowly 1) Uncontrolled Hypertension - BP stable now after starting the Clonidine. Continue to monitor        2) ESRD   RRT per renal     3) Vomiting resolved   Coffee ground emesis episode times 2.  resolved   H&H stable   offered EGD by GI but patient declined     4) Elevated Troponin  trended down   asymptomatic   will need stress test in future but will need EGD prior. patient declined EGD and stress test      6) DM  targer -180 in hospital .     7) DVT Prophylaxis   hep sub c       Follow up blood culture and c/w reglan. Possible gastroparesis. Advancing diet slowly vaginal bleeding

## 2022-12-24 NOTE — ED ADULT TRIAGE NOTE - CHIEF COMPLAINT QUOTE
" I am 9 weeks pregnant, I started bleeding today, I also had an episode of vaginal spotting last week and subsided, my doctor told me to come to ED "

## 2022-12-24 NOTE — REVIEW OF SYSTEMS
[Abdominal Pain] : abdominal pain [Chest Pain] : no chest pain [Fever] : no fever [Shortness Of Breath] : no shortness of breath [Dysuria] : no dysuria [Skin Rash] : no skin rash [FreeTextEntry7] : mild cramping  [FreeTextEntry8] : vaginal bleeding

## 2022-12-24 NOTE — PLAN
[FreeTextEntry1] : Go to [facility] Emergency Dept for evaluation They are aware that you are coming for evaluation

## 2022-12-24 NOTE — ED CLERICAL - NS ED CLERK NOTE PRE-ARRIVAL INFORMATION; ADDITIONAL PRE-ARRIVAL INFORMATION
This patient is enrolled in the [facility] readmission reduction program and has active care navigation. This patient can be followed up by the care navigation team within 24 hours. To arrange close follow-up or to obtain additional clinical information about this patient, please call the contact number above.

## 2022-12-24 NOTE — ED PROVIDER NOTE - CLINICAL SUMMARY MEDICAL DECISION MAKING FREE TEXT BOX
38 yo F, currently 9 weeks pregnant, with PMHx of miscarriage presents to ED c/o vaginal bleeding x 9am. A1. Pt also reports an episode of light vaginal spotting last week, but had bright red blood today. Pt reports that she had a miscarriage during her first pregnancy. Denies abd pain. Reports normal morning sickness symptoms. Pt saw GYN 3 weeks ago where she had US, normal results.  Pt states she has an scheduled appointment this upcoming Friday. Takes baby ASA because her pregnancies are "close together." Exam: VSS, afebrile, NAD, heart and lungs nml, abd soft and nontender, no mass of HSM, no rebound, no CVA tenderness. Extremities with no edema. Impression: early pregnancy with spotting r/o miscarriage. Plan: labs with Beta and US.

## 2022-12-24 NOTE — ED PROVIDER NOTE - PROGRESS NOTE DETAILS
Reevaluated patient at bedside.  Patient feeling well. Discussed the results of all diagnostic testing in ED and copies of all available reports given.   An opportunity to ask questions was provided.  Discussed the importance of prompt, close medical follow-up.  Patient will return with any changes, concerns or persistent/worsening symptoms.  Understanding of all instructions verbalized.    has f/u appt friday, advised to call monday when office is open to discuss sooner follow up

## 2022-12-24 NOTE — ED ADULT NURSE NOTE - OBJECTIVE STATEMENT
37 YOF A&OX3 presents for fro vaginal bleeding. pt states is 9 weeks pregnant and is having abnormal bleeding that started last week. pt was instructed by MD to go to ED. pt denies dizziness, sob, chest pain, n/v/d. safety maintained. 37 YOF A&OX3 presents for vaginal bleeding. pt states is 9 weeks pregnant and is having abnormal bleeding that started last week. pt was instructed by MD to go to ED. pt denies dizziness, sob, chest pain, n/v/d. safety maintained.

## 2022-12-24 NOTE — ASSESSMENT
[FreeTextEntry1] : On exam pt well appearing in NAD Imp Vaginal bleeding in pregnancy R/O missed vs threatened Ab

## 2022-12-28 ENCOUNTER — TRANSCRIPTION ENCOUNTER (OUTPATIENT)
Age: 37
End: 2022-12-28

## 2022-12-29 NOTE — OB RN DELIVERY SUMMARY - BABY A: WEIGHT (GM) DELIVERY
[Continuous Glucose Monitoring] : Continuous Glucose Monitoring: No [Finger Stick] : Finger Stick: Yes [Hypoglycemia] : Patient is not hypoglycemic. [FreeTextEntry9] :  [FreeTextEntry1] : test 1-3 x a day [id number]

## 2024-08-19 NOTE — OB RN DELIVERY SUMMARY - NS_MECONIUTRACHA_OBGYN_ALL_OB
Pt began 2 days go with body aches , chills, nausea, urinary urgency ans now has back pain.  She vomited once, but her back pain is getting worse  
None

## 2024-10-04 ENCOUNTER — ASOB RESULT (OUTPATIENT)
Age: 39
End: 2024-10-04

## 2024-10-04 ENCOUNTER — APPOINTMENT (OUTPATIENT)
Dept: ANTEPARTUM | Facility: CLINIC | Age: 39
End: 2024-10-04
Payer: COMMERCIAL

## 2024-10-04 PROCEDURE — 76811 OB US DETAILED SNGL FETUS: CPT

## 2024-12-19 NOTE — ED ADULT TRIAGE NOTE - NSWEIGHTCALCTOOLDRUG_GEN_A_CORE
Occupational Therapy   Evaluation and Discharge Note    Name: Karma Chen  MRN: [id number]  Admitting Diagnosis: Tachycardia  Recent Surgery: * No surgery found *      Recommendations:     Discharge Recommendations: No Therapy Indicated  Discharge Equipment Recommendations: none  Barriers to discharge:  None    Assessment:     Karma Chen is a 89 y.o. female with a medical diagnosis of Tachycardia. At this time, patient is modified independent with ADLs. Patient does not require further acute OT services.     Plan:     During this hospitalization, patient does not require further acute OT services.  Please re-consult if situation changes.    Plan of Care Reviewed with: patient    Subjective     Chief Complaint: none  Patient/Family Comments/goals: none    Occupational Profile:  Living Environment: Patient lives with  in a [facility].   Previous level of function: Patient was independent with ADLs and ambulatory with rollator  Roles and Routines: Patient still drives.   Equipment Used at home: rollator, cane, straight  Assistance upon Discharge: Patient will receive assist from  if needed.     Pain/Comfort:  Pain Rating 1: 0/10  Pain Rating Post-Intervention 1: 0/10    Patients cultural, spiritual, Nondenominational conflicts given the current situation: no    Objective:     Communicated with: nurse Knight prior to session.  Patient found HOB elevated with bed alarm, telemetry upon OT entry to room.    General Precautions: Standard, fall  Orthopedic Precautions: N/A  Braces: N/A  Respiratory Status: Room air     Occupational Performance:    Bed Mobility:    Patient completed Scooting/Bridging with modified independence  Patient completed Supine to Sit with modified independence  Patient completed Sit to Supine with modified independence    Functional Mobility/Transfers:  Patient completed Sit <> Stand Transfer with modified independence  with  no assistive device   Patient completed Toilet Transfer Stand Pivot  technique with modified independence with  no AD    Activities of Daily Living:  Grooming: modified independence with hand hygiene while standing at sink  Lower Body Dressing: modified independence to don/doff socks seated EOB  Toileting: modified independence     Cognitive/Visual Perceptual:  Cognitive/Psychosocial Skills:     -       Oriented to: Person, Place, Time, and Situation   -       Follows Commands/attention:Follows multistep  commands  -       Communication: clear/fluent  -       Safety awareness/insight to disability: intact   -       Mood/Affect/Coping skills/emotional control: Appropriate to situation and Cooperative  Visual/Perceptual:      -Intact     Physical Exam:  Postural examination/scapula alignment:    -       Rounded shoulders  -       Forward head  Upper Extremity Range of Motion:     -       Right Upper Extremity: WFL  -       Left Upper Extremity: WFL  Upper Extremity Strength:    -       Right Upper Extremity: WFL  -       Left Upper Extremity: WFL   Strength:    -       Right Upper Extremity: WFL  -       Left Upper Extremity: WFL  Fine Motor Coordination:    -       Intact  Gross motor coordination:   WFL    AMPAC 6 Click ADL:  AMPAC Total Score: 24    Treatment & Education:  Pt was educated on the role of occupational therapy and the importance of safely and independently completing ADLs and functional mobility.      Patient left HOB elevated with all lines intact and call button in reach    GOALS:   Multidisciplinary Problems       Occupational Therapy Goals       Not on file                    History:     Past Medical History:   Diagnosis Date    Coronary artery disease     Dermatitis     Dermatitis 12/8/2017    Diabetes mellitus     Diabetes mellitus type II     Leah Barr virus infection     Fibromyalgia     GERD (gastroesophageal reflux disease)     Hypertension     MI (myocardial infarction) 09/23/2011    Pure hypercholesterolemia 2/26/2020         Past Surgical History:    Procedure Laterality Date    adenoids      ANGIOPLASTY  1987    APPENDECTOMY      CARDIAC PACEMAKER PLACEMENT  01/12/2017    CATARACT EXTRACTION, BILATERAL Bilateral 9/2/15, 3/17/15    right eye-9/2/15, left eye-3/17/15    CHOLECYSTECTOMY  1987    CORONARY ARTERY BYPASS GRAFT  2006    quadruple    coronary stents  1999    x2    CYST REMOVAL  04/25/2017    on back    HYSTERECTOMY  1966    OVARY SURGERY  1948    Ovary burst & was repaired    RHIZOTOMY  09/14/2018    TONSILLECTOMY  1940       Time Tracking:     OT Date of Treatment: 12/19/24  OT Start Time: 0907  OT Stop Time: 0921  OT Total Time (min): 14 min    Billable Minutes:Evaluation 6  Self Care/Home Management 8    12/19/2024    used

## 2025-02-15 ENCOUNTER — INPATIENT (INPATIENT)
Facility: HOSPITAL | Age: 40
LOS: 1 days | Discharge: HOME | DRG: 951 | End: 2025-02-17
Attending: OBSTETRICS & GYNECOLOGY | Admitting: OBSTETRICS & GYNECOLOGY
Payer: COMMERCIAL

## 2025-02-15 VITALS — OXYGEN SATURATION: 99 % | HEART RATE: 87 BPM

## 2025-02-15 DIAGNOSIS — O26.899 OTHER SPECIFIED PREGNANCY RELATED CONDITIONS, UNSPECIFIED TRIMESTER: ICD-10-CM

## 2025-02-15 DIAGNOSIS — Z98.890 OTHER SPECIFIED POSTPROCEDURAL STATES: Chronic | ICD-10-CM

## 2025-02-15 DIAGNOSIS — Z34.80 ENCOUNTER FOR SUPERVISION OF OTHER NORMAL PREGNANCY, UNSPECIFIED TRIMESTER: ICD-10-CM

## 2025-02-15 LAB
BASOPHILS # BLD AUTO: 0.03 K/UL — SIGNIFICANT CHANGE UP (ref 0–0.2)
BASOPHILS NFR BLD AUTO: 0.3 % — SIGNIFICANT CHANGE UP (ref 0–2)
BLD GP AB SCN SERPL QL: NEGATIVE — SIGNIFICANT CHANGE UP
EOSINOPHIL # BLD AUTO: 0.09 K/UL — SIGNIFICANT CHANGE UP (ref 0–0.5)
EOSINOPHIL NFR BLD AUTO: 0.9 % — SIGNIFICANT CHANGE UP (ref 0–6)
HCT VFR BLD CALC: 33.9 % — LOW (ref 34.5–45)
HGB BLD-MCNC: 11.9 G/DL — SIGNIFICANT CHANGE UP (ref 11.5–15.5)
IMM GRANULOCYTES NFR BLD AUTO: 0.5 % — SIGNIFICANT CHANGE UP (ref 0–0.9)
LYMPHOCYTES # BLD AUTO: 1.8 K/UL — SIGNIFICANT CHANGE UP (ref 1–3.3)
LYMPHOCYTES # BLD AUTO: 17 % — SIGNIFICANT CHANGE UP (ref 13–44)
MCHC RBC-ENTMCNC: 33.4 PG — SIGNIFICANT CHANGE UP (ref 27–34)
MCHC RBC-ENTMCNC: 35.1 G/DL — SIGNIFICANT CHANGE UP (ref 32–36)
MCV RBC AUTO: 95.2 FL — SIGNIFICANT CHANGE UP (ref 80–100)
MONOCYTES # BLD AUTO: 0.92 K/UL — HIGH (ref 0–0.9)
MONOCYTES NFR BLD AUTO: 8.7 % — SIGNIFICANT CHANGE UP (ref 2–14)
NEUTROPHILS # BLD AUTO: 7.68 K/UL — HIGH (ref 1.8–7.4)
NEUTROPHILS NFR BLD AUTO: 72.6 % — SIGNIFICANT CHANGE UP (ref 43–77)
NRBC BLD AUTO-RTO: 0 /100 WBCS — SIGNIFICANT CHANGE UP (ref 0–0)
PLATELET # BLD AUTO: 128 K/UL — LOW (ref 150–400)
RBC # BLD: 3.56 M/UL — LOW (ref 3.8–5.2)
RBC # FLD: 13.9 % — SIGNIFICANT CHANGE UP (ref 10.3–14.5)
RH IG SCN BLD-IMP: POSITIVE — SIGNIFICANT CHANGE UP
WBC # BLD: 10.57 K/UL — HIGH (ref 3.8–10.5)
WBC # FLD AUTO: 10.57 K/UL — HIGH (ref 3.8–10.5)

## 2025-02-15 RX ORDER — CITRIC ACID/SODIUM CITRATE 300-500 MG
15 SOLUTION, ORAL ORAL EVERY 6 HOURS
Refills: 0 | Status: DISCONTINUED | OUTPATIENT
Start: 2025-02-15 | End: 2025-02-16

## 2025-02-15 RX ORDER — OXYTOCIN-SODIUM CHLORIDE 0.9% IV SOLN 30 UNIT/500ML 30-0.9/5 UT/ML-%
SOLUTION INTRAVENOUS
Qty: 30 | Refills: 0 | Status: DISCONTINUED | OUTPATIENT
Start: 2025-02-15 | End: 2025-02-16

## 2025-02-15 RX ORDER — SODIUM CHLORIDE 9 G/1000ML
1000 INJECTION, SOLUTION INTRAVENOUS
Refills: 0 | Status: DISCONTINUED | OUTPATIENT
Start: 2025-02-15 | End: 2025-02-16

## 2025-02-15 RX ORDER — OXYTOCIN-SODIUM CHLORIDE 0.9% IV SOLN 30 UNIT/500ML 30-0.9/5 UT/ML-%
167 SOLUTION INTRAVENOUS
Qty: 30 | Refills: 0 | Status: COMPLETED | OUTPATIENT
Start: 2025-02-15 | End: 2025-02-16

## 2025-02-15 RX ADMIN — SODIUM CHLORIDE 125 MILLILITER(S): 9 INJECTION, SOLUTION INTRAVENOUS at 23:00

## 2025-02-15 RX ADMIN — OXYTOCIN-SODIUM CHLORIDE 0.9% IV SOLN 30 UNIT/500ML 2 MILLIUNIT(S)/MIN: 30-0.9/5 SOLUTION at 23:00

## 2025-02-15 RX ADMIN — SODIUM CHLORIDE 125 MILLILITER(S): 9 INJECTION, SOLUTION INTRAVENOUS at 21:14

## 2025-02-15 NOTE — OB PROVIDER H&P - ASSESSMENT
38 [id number] @39+1 here in early labor.     Plan  1. Admit to LND. Routine Labs. IVF.  2. Expectant management  3. Fetus: cat 1 tracing. VTX. Continuous EFM. Sono. No concerns.  4. Prenatal issues: none  5. GBS neg  6. Pain: IV pain meds/epidural PRN    Patient discussed with attending physician, Dr. Yulisa Watson PGY1

## 2025-02-15 NOTE — OB RN TRIAGE NOTE - FALL HARM RISK - ATTEMPT OOB
ROS:     MEDICATIONS  (STANDING):  apixaban 5 milliGRAM(s) Oral two times a day  atorvastatin 40 milliGRAM(s) Oral at bedtime  dextrose 5%. 1000 milliLiter(s) (50 mL/Hr) IV Continuous <Continuous>  dextrose 5%. 1000 milliLiter(s) (100 mL/Hr) IV Continuous <Continuous>  dextrose 50% Injectable 12.5 Gram(s) IV Push once  dextrose 50% Injectable 25 Gram(s) IV Push once  dextrose 50% Injectable 25 Gram(s) IV Push once  diltiazem    Tablet 60 milliGRAM(s) Oral four times a day  glucagon  Injectable 1 milliGRAM(s) IntraMuscular once  insulin glargine Injectable (LANTUS) 15 Unit(s) SubCutaneous at bedtime  insulin lispro (ADMELOG) corrective regimen sliding scale   SubCutaneous three times a day before meals  insulin lispro (ADMELOG) corrective regimen sliding scale   SubCutaneous at bedtime  lidocaine   4% Patch 1 Patch Transdermal daily  melatonin 3 milliGRAM(s) Oral at bedtime  metoprolol succinate ER 50 milliGRAM(s) Oral daily      Vital Signs Last 24 Hrs  T(C): 36.4 (14 Mar 2024 07:30), Max: 36.5 (13 Mar 2024 20:55)  T(F): 97.6 (14 Mar 2024 07:30), Max: 97.7 (13 Mar 2024 20:55)  HR: 74 (14 Mar 2024 07:30) (69 - 74)  BP: 155/48 (14 Mar 2024 07:30) (149/61 - 162/52)  BP(mean): --  RR: 18 (14 Mar 2024 07:30) (18 - 18)  SpO2: 93% (14 Mar 2024 07:30) (93% - 97%)    Parameters below as of 14 Mar 2024 07:30  Patient On (Oxygen Delivery Method): room air      Neurological exam:  HF: A x O x 3. Appropriately interactive, normal affect. Speech fluent, No Aphasia or paraphasic errors. Naming /repetition intact   CN: TEN, EOMI, VFF, facial sensation normal, no NLFD, tongue midline, Palate moves equally, SCM equal bilaterally  Motor: No pronator drift, Strength 5/5 in all 4 ext  Sens: Intact to light touch   Reflexes: Symmetric and normal, downgoing toes b/l  Coord:  No FNFA, unable to do HTS 2/2 LE arthritic pain  Gait/Balance: Cannot test    NIHSS: 0                        11.4   13.43 )-----------( 139      ( 13 Mar 2024 06:46 )             35.1     03-13    138  |  108  |  21  ----------------------------<  189<H>  3.7   |  24  |  1.01    Ca    8.4<L>      13 Mar 2024 06:46  Phos  3.3     03-13  Mg     1.7     03-13    TPro  7.0  /  Alb  2.5<L>  /  TBili  1.0  /  DBili  0.3  /  AST  16  /  ALT  11<L>  /  AlkPhos  104  03-13 03-12 Chol 123 LDL -- HDL 50<L> Trig 91    Radiology report:  < from: US Duplex Carotid Arteries Complete, Bilateral (03.13.24 @ 18:29) >    IMPRESSION: No significant hemodynamic stenosis of either internal   carotid artery.  Mild right external carotid artery stenosis.    < from: CT Brain Stroke Protocol (03.11.24 @ 19:36) >  IMPRESSION:  HEAD CT: Mild volume loss, microvascular disease, no acute hemorrhage or   midline shift.       No     ROS:     MEDICATIONS  (STANDING):  apixaban 5 milliGRAM(s) Oral two times a day  atorvastatin 40 milliGRAM(s) Oral at bedtime  dextrose 5%. 1000 milliLiter(s) (50 mL/Hr) IV Continuous <Continuous>  dextrose 5%. 1000 milliLiter(s) (100 mL/Hr) IV Continuous <Continuous>  dextrose 50% Injectable 12.5 Gram(s) IV Push once  dextrose 50% Injectable 25 Gram(s) IV Push once  dextrose 50% Injectable 25 Gram(s) IV Push once  diltiazem    Tablet 60 milliGRAM(s) Oral four times a day  glucagon  Injectable 1 milliGRAM(s) IntraMuscular once  insulin glargine Injectable (LANTUS) 15 Unit(s) SubCutaneous at bedtime  insulin lispro (ADMELOG) corrective regimen sliding scale   SubCutaneous three times a day before meals  insulin lispro (ADMELOG) corrective regimen sliding scale   SubCutaneous at bedtime  lidocaine   4% Patch 1 Patch Transdermal daily  melatonin 3 milliGRAM(s) Oral at bedtime  metoprolol succinate ER 50 milliGRAM(s) Oral daily      Vital Signs Last 24 Hrs  T(C): 36.4 (14 Mar 2024 07:30), Max: 36.5 (13 Mar 2024 20:55)  T(F): 97.6 (14 Mar 2024 07:30), Max: 97.7 (13 Mar 2024 20:55)  HR: 74 (14 Mar 2024 07:30) (69 - 74)  BP: 155/48 (14 Mar 2024 07:30) (149/61 - 162/52)  BP(mean): --  RR: 18 (14 Mar 2024 07:30) (18 - 18)  SpO2: 93% (14 Mar 2024 07:30) (93% - 97%)    Parameters below as of 14 Mar 2024 07:30  Patient On (Oxygen Delivery Method): room air      Neurological exam:  HF: A x O x 3. Appropriately interactive, normal affect. Speech fluent, No Aphasia or paraphasic errors. Naming /repetition intact   CN: TEN, EOMI, VFF, facial sensation normal, no NLFD, tongue midline, Palate moves equally, SCM equal bilaterally  Motor: No pronator drift, Strength 5/5 in all 4 ext  Sens: Intact to light touch   Reflexes: Symmetric and normal, downgoing toes b/l  Coord:  No FNFA, unable to do HTS 2/2 LE arthritic pain  Gait/Balance: Cannot test    NIHSS: 0                        11.4   13.43 )-----------( 139      ( 13 Mar 2024 06:46 )             35.1     03-13    138  |  108  |  21  ----------------------------<  189<H>  3.7   |  24  |  1.01    Ca    8.4<L>      13 Mar 2024 06:46  Phos  3.3     03-13  Mg     1.7     03-13    TPro  7.0  /  Alb  2.5<L>  /  TBili  1.0  /  DBili  0.3  /  AST  16  /  ALT  11<L>  /  AlkPhos  104  03-13 03-12 Chol 123 LDL -- HDL 50<L> Trig 91    Radiology report:  < from: US Duplex Carotid Arteries Complete, Bilateral (03.13.24 @ 18:29) >    IMPRESSION: No significant hemodynamic stenosis of either internal   carotid artery.  Mild right external carotid artery stenosis.    < from: CT Brain Stroke Protocol (03.11.24 @ 19:36) >  IMPRESSION:  HEAD CT: Mild volume loss, microvascular disease, no acute hemorrhage or   midline shift.    < from: TTE Echo Complete w/o Contrast w/ Doppler (03.12.24 @ 14:37) >   Impression     Summary     Well seated prosthetic valve in the aortic position. Peak   trans-prosthetic   gradient is within normal limitations for this type of prosthesis.   The left atrium is mildly dilated.   Mild asymmetrical septalleft ventricular hypertrophy is present.   The left ventricle is normal in wall motion, and contractility.   Estimated left ventricular ejection fraction is 55-60 %.   Diastolic dysfunction is present.   The IVC appears normal in size.   Moderate to severe mitral annular calcification is present.   There is calcification of mitral valve leaflets. The leaflet opening is   restricted.   Mean transmitral gradient is 5 mmHg; this finding is consistent with mild   to moderate mitral stenosis.   Moderate (2+) mitral regurgitation is present.   No evidence of pericardial effusion.   No evidence of pleural effusion.   Pulmonic valve not well seen.   Normal appearing right atrium.   A device wire is seen in the RV and RA.   The right ventricle is not well seen, appears grossly normal in size.   The tricuspid valve leaflets are thin and pliable; valve motion is   normal.   Mild (1+) tricuspid valve regurgitation is present.   Moderate pulmonary hypertension.         NO acute events overnight, no new complaints, EP OK with MRI.  MRI/A H&N pending    ROS: As stated above, otherwise neg    MEDICATIONS  (STANDING):  apixaban 5 milliGRAM(s) Oral two times a day  atorvastatin 40 milliGRAM(s) Oral at bedtime  dextrose 5%. 1000 milliLiter(s) (50 mL/Hr) IV Continuous <Continuous>  dextrose 5%. 1000 milliLiter(s) (100 mL/Hr) IV Continuous <Continuous>  dextrose 50% Injectable 12.5 Gram(s) IV Push once  dextrose 50% Injectable 25 Gram(s) IV Push once  dextrose 50% Injectable 25 Gram(s) IV Push once  diltiazem    Tablet 60 milliGRAM(s) Oral four times a day  glucagon  Injectable 1 milliGRAM(s) IntraMuscular once  insulin glargine Injectable (LANTUS) 15 Unit(s) SubCutaneous at bedtime  insulin lispro (ADMELOG) corrective regimen sliding scale   SubCutaneous three times a day before meals  insulin lispro (ADMELOG) corrective regimen sliding scale   SubCutaneous at bedtime  lidocaine   4% Patch 1 Patch Transdermal daily  melatonin 3 milliGRAM(s) Oral at bedtime  metoprolol succinate ER 50 milliGRAM(s) Oral daily      Vital Signs Last 24 Hrs  T(C): 36.4 (14 Mar 2024 07:30), Max: 36.5 (13 Mar 2024 20:55)  T(F): 97.6 (14 Mar 2024 07:30), Max: 97.7 (13 Mar 2024 20:55)  HR: 74 (14 Mar 2024 07:30) (69 - 74)  BP: 155/48 (14 Mar 2024 07:30) (149/61 - 162/52)  BP(mean): --  RR: 18 (14 Mar 2024 07:30) (18 - 18)  SpO2: 93% (14 Mar 2024 07:30) (93% - 97%)    Parameters below as of 14 Mar 2024 07:30  Patient On (Oxygen Delivery Method): room air      Neurological exam:  HF: A x O x 3. Appropriately interactive, normal affect. Speech fluent, No Aphasia or paraphasic errors. Naming /repetition intact   CN: TEN, EOMI, VFF, facial sensation normal, no NLFD, tongue midline, Palate moves equally, SCM equal bilaterally  Motor: No pronator drift, Strength 5/5 in all 4 ext  Sens: Intact to light touch   Reflexes: Symmetric and normal, downgoing toes b/l  Coord:  No FNFA, unable to do HTS 2/2 LE arthritic pain  Gait/Balance: Cannot test    NIHSS: 0                        11.4   13.43 )-----------( 139      ( 13 Mar 2024 06:46 )             35.1     03-13    138  |  108  |  21  ----------------------------<  189<H>  3.7   |  24  |  1.01    Ca    8.4<L>      13 Mar 2024 06:46  Phos  3.3     03-13  Mg     1.7     03-13    TPro  7.0  /  Alb  2.5<L>  /  TBili  1.0  /  DBili  0.3  /  AST  16  /  ALT  11<L>  /  AlkPhos  104  03-13      03-12 Chol 123 LDL -- HDL 50<L> Trig 91    Radiology report:  < from: US Duplex Carotid Arteries Complete, Bilateral (03.13.24 @ 18:29) >    IMPRESSION: No significant hemodynamic stenosis of either internal   carotid artery.  Mild right external carotid artery stenosis.    < from: CT Brain Stroke Protocol (03.11.24 @ 19:36) >  IMPRESSION:  HEAD CT: Mild volume loss, microvascular disease, no acute hemorrhage or   midline shift.    < from: TTE Echo Complete w/o Contrast w/ Doppler (03.12.24 @ 14:37) >   Impression     Summary     Well seated prosthetic valve in the aortic position. Peak   trans-prosthetic   gradient is within normal limitations for this type of prosthesis.   The left atrium is mildly dilated.   Mild asymmetrical septalleft ventricular hypertrophy is present.   The left ventricle is normal in wall motion, and contractility.   Estimated left ventricular ejection fraction is 55-60 %.   Diastolic dysfunction is present.   The IVC appears normal in size.   Moderate to severe mitral annular calcification is present.   There is calcification of mitral valve leaflets. The leaflet opening is   restricted.   Mean transmitral gradient is 5 mmHg; this finding is consistent with mild   to moderate mitral stenosis.   Moderate (2+) mitral regurgitation is present.   No evidence of pericardial effusion.   No evidence of pleural effusion.   Pulmonic valve not well seen.   Normal appearing right atrium.   A device wire is seen in the RV and RA.   The right ventricle is not well seen, appears grossly normal in size.   The tricuspid valve leaflets are thin and pliable; valve motion is   normal.   Mild (1+) tricuspid valve regurgitation is present.   Moderate pulmonary hypertension.

## 2025-02-15 NOTE — OB PROVIDER H&P - NSPREVIOUSLIVEBIRTHSNOWDEAD_OBGYN_ALL_OB_NU
Spoke with patient and informed her that her left breast us was due. Pt requested that the orders be sent to [facility]. Orders faxed to [facility]   1

## 2025-02-15 NOTE — OB PROVIDER H&P - HISTORY OF PRESENT ILLNESS
40 [id number] @39+1 here in early labor. Endorses ctx that began @2p today, now 15 min apart, previously 8 min apart. -vb, -lof, +fm  GBS neg EFW: 3650g extrapolated from office sono     ObHx:   7#14, SAB x2 w D&C x2  GynHx: D&C x2, otherwise denies  PMSH: carpal tunnel  Med: Iron, vit C, PNV  All: NKDA  Pysch: denies  SH: denies  Willing to accept blood: yes

## 2025-02-15 NOTE — OB RN PATIENT PROFILE - NSSDOHFOODOUT_OBGYN_A_OB
DISPLAY PLAN FREE TEXT DISPLAY PLAN FREE TEXT DISPLAY PLAN FREE TEXT DISPLAY PLAN FREE TEXT DISPLAY PLAN FREE TEXT DISPLAY PLAN FREE TEXT DISPLAY PLAN FREE TEXT never true

## 2025-02-15 NOTE — OB PROVIDER H&P - NSHPPHYSICALEXAM_GEN_ALL_CORE
Gen: resting comfortably on room air  Pulm: breathing comfortably on room air  Abd: Gravid non tender  Ext: moving all ext w ease    VE: 3.5/50/-3  Sono: vtx  FHT: 135 baseline mod variability + accel -decel  [place]: irregular

## 2025-02-15 NOTE — OB PROVIDER H&P - ATTENDING COMMENTS
Patient seen and examined. P1 here in early labor. GBS negative. Just received epidural and comfortable    EFM: Cat I   TOCO 1 in ten or 15    Urinary serrato placed by myself  arom clear fluid    Plan:   to start pitocin  consents signed. Risks reviewed for vaginal, operative and  section.   all questions answered    Lisa Márquez DO

## 2025-02-16 LAB — T PALLIDUM AB TITR SER: NEGATIVE — SIGNIFICANT CHANGE UP

## 2025-02-16 RX ORDER — DIPHENHYDRAMINE HCL 12.5MG/5ML
25 ELIXIR ORAL EVERY 6 HOURS
Refills: 0 | Status: DISCONTINUED | OUTPATIENT
Start: 2025-02-16 | End: 2025-02-17

## 2025-02-16 RX ORDER — PRENATAL 136/IRON/FOLIC ACID 27 MG-1 MG
1 TABLET ORAL DAILY
Refills: 0 | Status: DISCONTINUED | OUTPATIENT
Start: 2025-02-16 | End: 2025-02-17

## 2025-02-16 RX ORDER — IBUPROFEN 200 MG
600 TABLET ORAL EVERY 6 HOURS
Refills: 0 | Status: DISCONTINUED | OUTPATIENT
Start: 2025-02-16 | End: 2025-02-17

## 2025-02-16 RX ORDER — OXYTOCIN-SODIUM CHLORIDE 0.9% IV SOLN 30 UNIT/500ML 30-0.9/5 UT/ML-%
167 SOLUTION INTRAVENOUS
Qty: 30 | Refills: 0 | Status: DISCONTINUED | OUTPATIENT
Start: 2025-02-16 | End: 2025-02-17

## 2025-02-16 RX ORDER — KETOROLAC TROMETHAMINE 30 MG/ML
30 INJECTION, SOLUTION INTRAMUSCULAR; INTRAVENOUS ONCE
Refills: 0 | Status: DISCONTINUED | OUTPATIENT
Start: 2025-02-16 | End: 2025-02-16

## 2025-02-16 RX ORDER — DIBUCAINE 10 MG/G
1 OINTMENT TOPICAL EVERY 6 HOURS
Refills: 0 | Status: DISCONTINUED | OUTPATIENT
Start: 2025-02-16 | End: 2025-02-17

## 2025-02-16 RX ORDER — BENZOCAINE 220 MG/G
1 SPRAY, METERED PERIODONTAL EVERY 6 HOURS
Refills: 0 | Status: DISCONTINUED | OUTPATIENT
Start: 2025-02-16 | End: 2025-02-17

## 2025-02-16 RX ORDER — ACETAMINOPHEN 500 MG/5ML
975 LIQUID (ML) ORAL
Refills: 0 | Status: DISCONTINUED | OUTPATIENT
Start: 2025-02-16 | End: 2025-02-17

## 2025-02-16 RX ORDER — IBUPROFEN 200 MG
600 TABLET ORAL EVERY 6 HOURS
Refills: 0 | Status: COMPLETED | OUTPATIENT
Start: 2025-02-16 | End: 2026-01-15

## 2025-02-16 RX ORDER — MAGNESIUM HYDROXIDE 400 MG/5ML
30 SUSPENSION ORAL
Refills: 0 | Status: DISCONTINUED | OUTPATIENT
Start: 2025-02-16 | End: 2025-02-17

## 2025-02-16 RX ORDER — MODIFIED LANOLIN 100 %
1 CREAM (GRAM) TOPICAL EVERY 6 HOURS
Refills: 0 | Status: DISCONTINUED | OUTPATIENT
Start: 2025-02-16 | End: 2025-02-17

## 2025-02-16 RX ORDER — CLOSTRIDIUM TETANI TOXOID ANTIGEN (FORMALDEHYDE INACTIVATED), CORYNEBACTERIUM DIPHTHERIAE TOXOID ANTIGEN (FORMALDEHYDE INACTIVATED), BORDETELLA PERTUSSIS TOXOID ANTIGEN (GLUTARALDEHYDE INACTIVATED), BORDETELLA PERTUSSIS FILAMENTOUS HEMAGGLUTININ ANTIGEN (FORMALDEHYDE INACTIVATED), BORDETELLA PERTUSSIS PERTACTIN ANTIGEN, AND BORDETELLA PERTUSSIS FIMBRIAE 2/3 ANTIGEN 5; 2; 2.5; 5; 3; 5 [LF]/.5ML; [LF]/.5ML; UG/.5ML; UG/.5ML; UG/.5ML; UG/.5ML
0.5 INJECTION, SUSPENSION INTRAMUSCULAR ONCE
Refills: 0 | Status: DISCONTINUED | OUTPATIENT
Start: 2025-02-16 | End: 2025-02-17

## 2025-02-16 RX ORDER — SIMETHICONE 80 MG
80 TABLET,CHEWABLE ORAL EVERY 4 HOURS
Refills: 0 | Status: DISCONTINUED | OUTPATIENT
Start: 2025-02-16 | End: 2025-02-17

## 2025-02-16 RX ORDER — HYDROCORTISONE 10 MG/G
1 CREAM TOPICAL EVERY 6 HOURS
Refills: 0 | Status: DISCONTINUED | OUTPATIENT
Start: 2025-02-16 | End: 2025-02-17

## 2025-02-16 RX ORDER — WITCH HAZEL LEAF
1 FLUID EXTRACT MISCELLANEOUS EVERY 4 HOURS
Refills: 0 | Status: DISCONTINUED | OUTPATIENT
Start: 2025-02-16 | End: 2025-02-17

## 2025-02-16 RX ORDER — PRAMOXINE HCL 1 %
1 GEL (GRAM) TOPICAL EVERY 4 HOURS
Refills: 0 | Status: DISCONTINUED | OUTPATIENT
Start: 2025-02-16 | End: 2025-02-17

## 2025-02-16 RX ORDER — MEASLES,MUMPS,RUBELLA LIVE VAC 20000/0.5
0.5 VIAL (EA) SUBCUTANEOUS ONCE
Refills: 0 | Status: COMPLETED | OUTPATIENT
Start: 2025-02-16 | End: 2025-02-17

## 2025-02-16 RX ADMIN — Medication 975 MILLIGRAM(S): at 20:46

## 2025-02-16 RX ADMIN — KETOROLAC TROMETHAMINE 30 MILLIGRAM(S): 30 INJECTION, SOLUTION INTRAMUSCULAR; INTRAVENOUS at 03:53

## 2025-02-16 RX ADMIN — Medication 975 MILLIGRAM(S): at 15:40

## 2025-02-16 RX ADMIN — OXYTOCIN-SODIUM CHLORIDE 0.9% IV SOLN 30 UNIT/500ML 167 MILLIUNIT(S)/MIN: 30-0.9/5 SOLUTION at 01:52

## 2025-02-16 RX ADMIN — Medication 975 MILLIGRAM(S): at 14:40

## 2025-02-16 RX ADMIN — Medication 600 MILLIGRAM(S): at 18:24

## 2025-02-16 RX ADMIN — Medication 600 MILLIGRAM(S): at 13:00

## 2025-02-16 RX ADMIN — Medication 600 MILLIGRAM(S): at 11:58

## 2025-02-16 RX ADMIN — Medication 3 MILLILITER(S): at 15:40

## 2025-02-16 RX ADMIN — Medication 1 TABLET(S): at 11:59

## 2025-02-16 RX ADMIN — Medication 975 MILLIGRAM(S): at 09:24

## 2025-02-16 RX ADMIN — Medication 600 MILLIGRAM(S): at 23:28

## 2025-02-16 RX ADMIN — Medication 600 MILLIGRAM(S): at 17:42

## 2025-02-16 RX ADMIN — Medication 975 MILLIGRAM(S): at 10:38

## 2025-02-16 RX ADMIN — Medication 975 MILLIGRAM(S): at 21:18

## 2025-02-16 NOTE — DISCHARGE NOTE OB - NS MD DC FALL RISK RISK
For information on Fall & Injury Prevention, visit: [email or web address] OR  [email or web address] OR  https://www.cdc.gov/steadi/patient.html

## 2025-02-16 NOTE — DISCHARGE NOTE OB - MEDICATION SUMMARY - MEDICATIONS TO TAKE
I will START or STAY ON the medications listed below when I get home from the hospital:    ibuprofen 600 mg oral tablet  -- 1 tab(s) by mouth every 6 hours  -- Indication: For pain    acetaminophen 325 mg oral tablet  -- 3 tab(s) by mouth every 6 hours as needed for pain  -- Indication: For pain    Prenatal Multivitamins oral tablet  -- 1 tab(s) by mouth once a day  -- Indication: For postpartum    ferrous sulfate 325 mg (65 mg elemental iron) oral tablet  -- 1 tab(s) by mouth 2 times a day  -- Indication: For postpartum

## 2025-02-16 NOTE — DISCHARGE NOTE OB - CARE PROVIDER_API CALL
Lisa Márquez  Obstetrics and Gynecology  [address], [address]  [place] [address]  Phone: [phone]  Fax: [phone]  Follow Up Time:

## 2025-02-16 NOTE — DISCHARGE NOTE OB - MEDICATION SUMMARY - MEDICATIONS TO CHANGE
I will SWITCH the dose or number of times a day I take the medications listed below when I get home from the hospital:    acetaminophen 325 mg oral tablet  -- 3 tab(s) by mouth every 4 hours

## 2025-02-16 NOTE — DISCHARGE NOTE OB - FINANCIAL ASSISTANCE
[facility] provides services at a reduced cost to those who are determined to be eligible through [facility]’s financial assistance program. Information regarding [facility]’s financial assistance program can be found by going to [email or web address] or by calling [phone].

## 2025-02-16 NOTE — DISCHARGE NOTE OB - CARE PROVIDERS DIRECT ADDRESSES
[email or web address] includes the [facility]

## 2025-02-16 NOTE — DISCHARGE NOTE OB - PATIENT PORTAL LINK FT
You can access the FollowMyHealth Patient Portal offered by [facility] by registering at the following website: [email or web address]. By joining [facility]’s FollowMyHealth portal, you will also be able to view your health information using other applications (apps) compatible with our system.

## 2025-02-16 NOTE — OB PROVIDER DELIVERY SUMMARY - NSSELHIDDEN_OBGYN_ALL_OB_FT
[NS_DeliveryAttending1_OBGYN_ALL_OB_FT:[email or web address]=],[NS_DeliveryAssist1_OBGYN_ALL_OB_FT:[id number]=]

## 2025-02-16 NOTE — DISCHARGE NOTE OB - PAIN IN THE CALVES OF YOUR LEGS
Statement Selected 58-year-old male past medical history diabetes, hyperlipidemia, hypertension presents to the emergency department for evaluation of nonbloody vomiting and diarrhea since last night after eating dinner.  Patient reports today having right-sided headache.  Patient denies fevers.  No chest pain or shortness of breath.  No numbness, tingling, vision changes.    Vital Signs: I have reviewed the initial vital signs.  Constitutional: Patient in no acute distress.  Integumentary: No rash.  EYES: EOMI, PERRL   ENT: MMM  NECK: Supple.   Cardiovascular: RRR, radial pulses 2/4 bilaterally.   Respiratory: Breath sounds CTA b/l, no wheezing or crackles, good air exchange, good resp effort and excursion, no accessory muscle use, no stridor.  Gastrointestinal: Abdomen soft, non-tender, non-distended, no rebound tenderness or guarding, no CVAT.   Musculoskeletal: FROM, no LE edema. distal pulses intact b/l.   Neurologic: AAOx3, motor 5/5 and sensation intact throughout upper and lower ext, CN II-XII intact, No facial droop or slurring of speech. No focal deficits.

## 2025-02-16 NOTE — OB RN DELIVERY SUMMARY - NS_SEPSISRSKCALC_OBGYN_ALL_OB_FT
EOS calculated successfully. EOS Risk Factor: 0.5/1000 live births (Formerly named [facility] national incidence); GA=39w2d; Temp=98.6; ROM=3.017; GBS='Negative'; Antibiotics='No antibiotics or any antibiotics < 2 hrs prior to birth'

## 2025-02-16 NOTE — OB PROVIDER DELIVERY SUMMARY - NSPROVIDERDELIVERYNOTE_OBGYN_ALL_OB_FT
Spontaneous vaginal delivery of liveborn infant from OA position. Head delivered, nuchal x1 reduced, and then shoulders and body delivered easily. Infant was suctioned. 1 minute delayed cord clamping was performed. Cord clamped and cut and infant passed to mother. Placenta delivered. Fundal massage was given and uterine fundus was found to be firm. Vaginal exam revealed an intact cervix, vaginal walls, and sulci. Patient had a 2nd degree laceration in the perineum that was repaired with 2.0 vicryl suture. Pt had a L periurethral that was hemostatic.  Excellent hemostasis was noted. Patient was stable and went to recovery. Count was correct x2.     Lauren Watson PGY1 Spontaneous vaginal delivery of liveborn infant from OA position. Head delivered, nuchal x1 reduced, and then shoulders and body delivered easily. Infant was suctioned. 1 minute delayed cord clamping was performed. Cord clamped and cut and infant passed to mother. Placenta delivered. Fundal massage was given and uterine fundus was found to be firm. Vaginal exam revealed an intact cervix, vaginal walls, and sulci. Patient had a 2nd degree laceration in the perineum that was repaired with 2.0 vicryl suture. Pt had a L periurethral that was hemostatic. rectal exam performed intact with no suture present. Excellent hemostasis was noted. Patient was stable and went to recovery. Count was correct x2.     Lauren Watson PGY1

## 2025-02-16 NOTE — DISCHARGE NOTE OB - PLAN OF CARE
please call if you have a fever, heavy vaginal bleeding, pain uncontrolled with pain medicine, signs of wound infection

## 2025-02-16 NOTE — OB RN DELIVERY SUMMARY - NSSELHIDDEN_OBGYN_ALL_OB_FT
[NS_DeliveryAttending1_OBGYN_ALL_OB_FT:[email or web address]=],[NS_DeliveryAssist1_OBGYN_ALL_OB_FT:[id number]=],[NS_DeliveryRN_OBGYN_ALL_OB_FT:[id number]=]

## 2025-02-16 NOTE — DISCHARGE NOTE OB - CARE PLAN
Principal Discharge DX:	Vaginal delivery  Assessment and plan of treatment:	please call if you have a fever, heavy vaginal bleeding, pain uncontrolled with pain medicine, signs of wound infection   1

## 2025-02-17 VITALS
HEART RATE: 79 BPM | SYSTOLIC BLOOD PRESSURE: 129 MMHG | TEMPERATURE: 98 F | DIASTOLIC BLOOD PRESSURE: 71 MMHG | OXYGEN SATURATION: 97 % | RESPIRATION RATE: 18 BRPM

## 2025-02-17 PROCEDURE — 85025 COMPLETE CBC W/AUTO DIFF WBC: CPT

## 2025-02-17 PROCEDURE — 86850 RBC ANTIBODY SCREEN: CPT

## 2025-02-17 PROCEDURE — 86901 BLOOD TYPING SEROLOGIC RH(D): CPT

## 2025-02-17 PROCEDURE — 86900 BLOOD TYPING SEROLOGIC ABO: CPT

## 2025-02-17 PROCEDURE — 86780 TREPONEMA PALLIDUM: CPT

## 2025-02-17 PROCEDURE — 59050 FETAL MONITOR W/REPORT: CPT

## 2025-02-17 PROCEDURE — 90707 MMR VACCINE SC: CPT

## 2025-02-17 RX ORDER — ACETAMINOPHEN 500 MG/5ML
3 LIQUID (ML) ORAL
Qty: 0 | Refills: 0 | DISCHARGE
Start: 2025-02-17

## 2025-02-17 RX ADMIN — Medication 3 MILLILITER(S): at 06:25

## 2025-02-17 RX ADMIN — Medication 600 MILLIGRAM(S): at 12:02

## 2025-02-17 RX ADMIN — Medication 600 MILLIGRAM(S): at 00:15

## 2025-02-17 RX ADMIN — Medication 600 MILLIGRAM(S): at 05:33

## 2025-02-17 RX ADMIN — Medication 975 MILLIGRAM(S): at 10:50

## 2025-02-17 RX ADMIN — Medication 0.5 MILLILITER(S): at 09:56

## 2025-02-17 RX ADMIN — Medication 600 MILLIGRAM(S): at 12:20

## 2025-02-17 RX ADMIN — Medication 975 MILLIGRAM(S): at 03:30

## 2025-02-17 RX ADMIN — Medication 975 MILLIGRAM(S): at 02:43

## 2025-02-17 RX ADMIN — Medication 975 MILLIGRAM(S): at 09:54

## 2025-02-17 RX ADMIN — Medication 1 TABLET(S): at 12:02

## 2025-02-17 RX ADMIN — Medication 600 MILLIGRAM(S): at 06:26

## 2025-02-17 NOTE — PROGRESS NOTE ADULT - NS ATTEND AMEND GEN_ALL_CORE FT
s/p , PPD#1, recovering well. No acute complaints. Minimal bleeding, voiding well.     Vital Signs Last 24 Hrs  T(C): 36.6 (2025 06:06), Max: 36.8 (2025 17:19)  T(F): 97.8 (2025 06:06), Max: 98.3 (2025 17:19)  HR: 85 (2025 06:06) (85 - 89)  BP: 125/72 (2025 06:06) (116/70 - 128/70)  RR: 18 (2025 06:06) (17 - 18)  SpO2: 99% (2025 06:06) (97% - 99%)    Parameters below as of 2025 06:06  Patient On (Oxygen Delivery Method): room air    Gen: AOx3, NAD  Abd: soft, nontender  Lochia: minimal    now P2 s/p , PPD#1, recovering well  -stable for d/c home

## 2025-02-17 NOTE — PROGRESS NOTE ADULT - SUBJECTIVE AND OBJECTIVE BOX
Postpartum Note- PPD#1    Allergies: No Known Allergies    Blood Type A   Positive  Rubella Immune  RPR : Negative    S:Patient is a 40y   PPD#1 S/P    Patient w/o complaints, pain is controlled.    Pt is OOB, tolerating PO, passing flatus. Lochia WNL.     Feeding: Bottle    O:  Vital Signs Last 24 Hrs  T(C): 36.6 (2025 06:06), Max: 36.8 (2025 17:19)  T(F): 97.8 (2025 06:06), Max: 98.3 (2025 17:19)  HR: 85 (2025 06:06) (85 - 90)  BP: 125/72 (2025 06:06) (106/59 - 128/70)  BP(mean): --  RR: 18 (2025 06:06) (17 - 18)  SpO2: 99% (2025 06:06) (97% - 99%)     Gen: NAD  Abdomen: Soft, nontender, non-distended, fundus firm.  Vaginal: Lochia WNL  Ext: Neg calf tenderness, neg edema    LABS:    Hemoglobin: 11.9 g/dL (02-15 @ 21:34)      Hematocrit: 33.9 % (02-15 @ 21:34)

## 2025-02-17 NOTE — PROGRESS NOTE ADULT - ASSESSMENT
A/P: 40y  PPD # 1 S/P   doing well    Current Issues: None    PAST MEDICAL & SURGICAL HISTORY:  GERD (gastroesophageal reflux disease)    H/O dilation and curettage

## 2025-02-25 NOTE — OB PROVIDER LABOR PROGRESS NOTE - NS_ADDCERVICALEXAM_OBGYN_ALL_OB
Patient left without being seen.  She had gotten viral swabs, and it did test positive for influenza A.  Patient was never evaluated by myself   I did call her at home, and told her of the result which she had check on mychart. And she reports she had to leave because she has a little one at home.  Discussed treatment of fevers,and keeping up with hydration, and that she can return to er for worsening symptoms         Rebekah Salazar,   02/24/25 [id number]    
Click to add…